# Patient Record
Sex: FEMALE | Race: WHITE | Employment: OTHER | URBAN - METROPOLITAN AREA
[De-identification: names, ages, dates, MRNs, and addresses within clinical notes are randomized per-mention and may not be internally consistent; named-entity substitution may affect disease eponyms.]

---

## 2019-01-08 LAB
AVERAGE GLUCOSE: NORMAL
HBA1C MFR BLD: 7.3 %

## 2019-02-18 ENCOUNTER — TELEPHONE (OUTPATIENT)
Dept: CARDIOLOGY CLINIC | Age: 59
End: 2019-02-18

## 2019-02-25 ENCOUNTER — OFFICE VISIT (OUTPATIENT)
Dept: CARDIOLOGY CLINIC | Age: 59
End: 2019-02-25
Payer: COMMERCIAL

## 2019-02-25 VITALS
HEIGHT: 64 IN | OXYGEN SATURATION: 99 % | SYSTOLIC BLOOD PRESSURE: 126 MMHG | BODY MASS INDEX: 46.95 KG/M2 | HEART RATE: 82 BPM | DIASTOLIC BLOOD PRESSURE: 84 MMHG | WEIGHT: 275 LBS

## 2019-02-25 DIAGNOSIS — E78.5 HYPERLIPIDEMIA LDL GOAL <130: ICD-10-CM

## 2019-02-25 DIAGNOSIS — Z01.810 PREOP CARDIOVASCULAR EXAM: Primary | ICD-10-CM

## 2019-02-25 DIAGNOSIS — I47.1 SVT (SUPRAVENTRICULAR TACHYCARDIA) (HCC): ICD-10-CM

## 2019-02-25 PROCEDURE — 93000 ELECTROCARDIOGRAM COMPLETE: CPT | Performed by: INTERNAL MEDICINE

## 2019-02-25 PROCEDURE — 99244 OFF/OP CNSLTJ NEW/EST MOD 40: CPT | Performed by: INTERNAL MEDICINE

## 2019-02-25 RX ORDER — SIMVASTATIN 20 MG
TABLET ORAL
COMMUNITY
Start: 2018-12-26

## 2019-02-25 RX ORDER — LANOLIN ALCOHOL/MO/W.PET/CERES
2500 CREAM (GRAM) TOPICAL
COMMUNITY

## 2019-02-25 RX ORDER — BUPROPION HYDROCHLORIDE 150 MG/1
150 TABLET ORAL
COMMUNITY
Start: 2019-01-08 | End: 2022-05-07

## 2019-02-25 RX ORDER — ALBUTEROL SULFATE 90 UG/1
2 AEROSOL, METERED RESPIRATORY (INHALATION)
COMMUNITY
Start: 2018-04-08

## 2019-02-25 RX ORDER — ESCITALOPRAM OXALATE 20 MG/1
20 TABLET ORAL
COMMUNITY
Start: 2018-11-01

## 2019-03-13 ENCOUNTER — OFFICE VISIT (OUTPATIENT)
Dept: PULMONOLOGY | Age: 59
End: 2019-03-13
Payer: COMMERCIAL

## 2019-03-13 VITALS
SYSTOLIC BLOOD PRESSURE: 131 MMHG | OXYGEN SATURATION: 96 % | HEART RATE: 80 BPM | DIASTOLIC BLOOD PRESSURE: 83 MMHG | WEIGHT: 275 LBS | BODY MASS INDEX: 46.95 KG/M2 | HEIGHT: 64 IN

## 2019-03-13 DIAGNOSIS — E66.01 MORBID OBESITY WITH BMI OF 45.0-49.9, ADULT (HCC): Chronic | ICD-10-CM

## 2019-03-13 DIAGNOSIS — G47.33 OBSTRUCTIVE SLEEP APNEA SYNDROME: Primary | ICD-10-CM

## 2019-03-13 DIAGNOSIS — I10 ESSENTIAL HYPERTENSION: Chronic | ICD-10-CM

## 2019-03-13 DIAGNOSIS — E11.9 CONTROLLED TYPE 2 DIABETES MELLITUS WITHOUT COMPLICATION, UNSPECIFIED WHETHER LONG TERM INSULIN USE (HCC): Chronic | ICD-10-CM

## 2019-03-13 PROCEDURE — 99204 OFFICE O/P NEW MOD 45 MIN: CPT | Performed by: INTERNAL MEDICINE

## 2019-03-13 ASSESSMENT — ENCOUNTER SYMPTOMS
NAUSEA: 0
ABDOMINAL DISTENTION: 0
PHOTOPHOBIA: 0
EYE PAIN: 0
ALLERGIC/IMMUNOLOGIC NEGATIVE: 1
RHINORRHEA: 0
ABDOMINAL PAIN: 0
VOMITING: 0
SHORTNESS OF BREATH: 0
APNEA: 0
CHOKING: 0
CHEST TIGHTNESS: 0

## 2019-03-13 ASSESSMENT — SLEEP AND FATIGUE QUESTIONNAIRES
HOW LIKELY ARE YOU TO NOD OFF OR FALL ASLEEP WHILE SITTING AND READING: 2
HOW LIKELY ARE YOU TO NOD OFF OR FALL ASLEEP WHEN YOU ARE A PASSENGER IN A CAR FOR AN HOUR WITHOUT A BREAK: 3
HOW LIKELY ARE YOU TO NOD OFF OR FALL ASLEEP IN A CAR, WHILE STOPPED FOR A FEW MINUTES IN TRAFFIC: 0
ESS TOTAL SCORE: 11
HOW LIKELY ARE YOU TO NOD OFF OR FALL ASLEEP WHILE SITTING AND TALKING TO SOMEONE: 1
HOW LIKELY ARE YOU TO NOD OFF OR FALL ASLEEP WHILE SITTING QUIETLY AFTER LUNCH WITHOUT ALCOHOL: 1
HOW LIKELY ARE YOU TO NOD OFF OR FALL ASLEEP WHILE SITTING INACTIVE IN A PUBLIC PLACE: 1
NECK CIRCUMFERENCE (INCHES): 17
HOW LIKELY ARE YOU TO NOD OFF OR FALL ASLEEP WHILE LYING DOWN TO REST IN THE AFTERNOON WHEN CIRCUMSTANCES PERMIT: 1
HOW LIKELY ARE YOU TO NOD OFF OR FALL ASLEEP WHILE WATCHING TV: 2

## 2019-06-17 ENCOUNTER — OFFICE VISIT (OUTPATIENT)
Dept: PULMONOLOGY | Age: 59
End: 2019-06-17
Payer: COMMERCIAL

## 2019-06-17 VITALS
HEIGHT: 64 IN | DIASTOLIC BLOOD PRESSURE: 88 MMHG | BODY MASS INDEX: 46.78 KG/M2 | WEIGHT: 274 LBS | SYSTOLIC BLOOD PRESSURE: 132 MMHG | OXYGEN SATURATION: 96 % | HEART RATE: 96 BPM

## 2019-06-17 DIAGNOSIS — E66.01 MORBID OBESITY WITH BMI OF 40.0-44.9, ADULT (HCC): Chronic | ICD-10-CM

## 2019-06-17 DIAGNOSIS — E11.9 CONTROLLED TYPE 2 DIABETES MELLITUS WITHOUT COMPLICATION, UNSPECIFIED WHETHER LONG TERM INSULIN USE (HCC): Chronic | ICD-10-CM

## 2019-06-17 DIAGNOSIS — G47.33 OBSTRUCTIVE SLEEP APNEA: Chronic | ICD-10-CM

## 2019-06-17 DIAGNOSIS — I10 ESSENTIAL HYPERTENSION: Chronic | ICD-10-CM

## 2019-06-17 PROCEDURE — 99214 OFFICE O/P EST MOD 30 MIN: CPT | Performed by: INTERNAL MEDICINE

## 2019-06-17 ASSESSMENT — SLEEP AND FATIGUE QUESTIONNAIRES
ESS TOTAL SCORE: 11
HOW LIKELY ARE YOU TO NOD OFF OR FALL ASLEEP IN A CAR, WHILE STOPPED FOR A FEW MINUTES IN TRAFFIC: 0
HOW LIKELY ARE YOU TO NOD OFF OR FALL ASLEEP WHILE SITTING AND TALKING TO SOMEONE: 1
HOW LIKELY ARE YOU TO NOD OFF OR FALL ASLEEP WHILE WATCHING TV: 2
HOW LIKELY ARE YOU TO NOD OFF OR FALL ASLEEP WHILE SITTING QUIETLY AFTER LUNCH WITHOUT ALCOHOL: 2
HOW LIKELY ARE YOU TO NOD OFF OR FALL ASLEEP WHILE SITTING INACTIVE IN A PUBLIC PLACE: 1
HOW LIKELY ARE YOU TO NOD OFF OR FALL ASLEEP WHEN YOU ARE A PASSENGER IN A CAR FOR AN HOUR WITHOUT A BREAK: 2
HOW LIKELY ARE YOU TO NOD OFF OR FALL ASLEEP WHILE SITTING AND READING: 3
HOW LIKELY ARE YOU TO NOD OFF OR FALL ASLEEP WHILE LYING DOWN TO REST IN THE AFTERNOON WHEN CIRCUMSTANCES PERMIT: 0

## 2019-06-17 ASSESSMENT — ENCOUNTER SYMPTOMS
RHINORRHEA: 0
APNEA: 0
COUGH: 0
SHORTNESS OF BREATH: 0
CHOKING: 0

## 2019-06-17 NOTE — PROGRESS NOTES
Yes  Flex/EPR (0-3): 3 PAP Mask  Mask Type: Nasal mask  Mask Model: Wisp  Mask Size: XL     GILES: She continues to do well with her machine at the current settings. No issues with EDS, snoring, or apneas. She is waking refreshed, for the most part, in the am.  No HA or congestion. No issues using her machine except rain-out and dryness. Air leaks out of her mouth at times. Hypertension, diabetes mellitus, and obesity: stable on meds and followed by pt's PCP and other physicians.     DME [facility]    [place] - Total score: 11    Social History     Socioeconomic History    Marital status: Single     Spouse name: Not on file    Number of children: Not on file    Years of education: Not on file    Highest education level: Not on file   Occupational History    Not on file   Social Needs    Financial resource strain: Not on file    Food insecurity:     Worry: Not on file     Inability: Not on file    Transportation needs:     Medical: Not on file     Non-medical: Not on file   Tobacco Use    Smoking status: Former Smoker     Packs/day: 1.00     Years: 18.00     Pack years: 18.00     Last attempt to quit:      Years since quittin.4    Smokeless tobacco: Never Used   Substance and Sexual Activity    Alcohol use: No    Drug use: No    Sexual activity: Not on file   Lifestyle    Physical activity:     Days per week: Not on file     Minutes per session: Not on file    Stress: Not on file   Relationships    Social connections:     Talks on phone: Not on file     Gets together: Not on file     Attends Latter-day service: Not on file     Active member of club or organization: Not on file     Attends meetings of clubs or organizations: Not on file     Relationship status: Not on file    Intimate partner violence:     Fear of current or ex partner: Not on file     Emotionally abused: Not on file     Physically abused: Not on file     Forced sexual activity: Not on file   Other Topics Concern    Not on file   Social History Narrative    Not on file       Current Outpatient Medications   Medication Sig Dispense Refill    albuterol sulfate HFA (VENTOLIN HFA) 108 (90 Base) MCG/ACT inhaler Inhale 2 puffs into the lungs      buPROPion (WELLBUTRIN XL) 150 MG extended release tablet Take 150 mg by mouth      escitalopram (LEXAPRO) 20 MG tablet Take 20 mg by mouth      Liraglutide (VICTOZA) 18 MG/3ML SOPN SC injection Inject 1.8 mg into the skin      simvastatin (ZOCOR) 20 MG tablet TAKE ONE TABLET BY MOUTH AT BEDTIME      vitamin B-12 (CYANOCOBALAMIN) 1000 MCG tablet Take 2,500 mcg by mouth      losartan (COZAAR) 25 MG tablet Take 0.5 tablets by mouth daily 30 tablet 3    Cholecalciferol (VITAMIN D3) 2000 UNITS CAPS Take 1 tablet by mouth 2 times daily.  aspirin 81 MG tablet Take 81 mg by mouth daily.  metFORMIN (GLUCOPHAGE) 500 MG tablet Take 1,000 mg by mouth daily (with breakfast). No current facility-administered medications for this visit. Allergies as of 06/17/2019    (No Known Allergies)       Patient Active Problem List   Diagnosis    Chest pain    Morbid obesity with BMI of 40.0-44.9, adult ([facility] [address]    Obstructive sleep apnea    Diabetes mellitus type 2, controlled, without complications (HCC)    Hyperlipidemia    SVT (supraventricular tachycardia) (HCC)    Essential hypertension       Past Medical History:   Diagnosis Date    Diabetes mellitus ([facility] [address]     Hyperlipidemia     Hypertension     Obstructive sleep apnea     Obstructive sleep apnea (adult) (pediatric)     Paroxysmal SVT (supraventricular tachycardia) (HCC)        Past Surgical History:   Procedure Laterality Date    ANKLE FUSION      BREAST SURGERY      FRACTURE SURGERY      HYSTERECTOMY      ROTATOR CUFF REPAIR         Family History   Problem Relation Age of Onset    Sleep Apnea Sister     Sleep Apnea Daughter            ROS:  Review of Systems   Constitutional: Negative.     HENT: Negative for congestion, ear pain and rhinorrhea. Respiratory: Negative for apnea, cough, choking and shortness of breath. Cardiovascular: Negative for chest pain, palpitations and leg swelling. Musculoskeletal: Negative for neck pain. Vitals:  Weight BMI   Wt Readings from Last 3 Encounters:   06/17/19 274 lb (124.3 kg)   03/13/19 275 lb (124.7 kg)   02/25/19 275 lb (124.7 kg)    Body mass index is 47.03 kg/m².      BP HR SaO2   BP Readings from Last 3 Encounters:   06/17/19 132/88   03/13/19 131/83   02/25/19 126/84    Pulse Readings from Last 3 Encounters:   06/17/19 96   03/13/19 80   02/25/19 82    SpO2 Readings from Last 3 Encounters:   06/17/19 96%   03/13/19 96%   02/25/19 99%          Electronically signed by Milka Malone MD on 6/17/2019 at 3:03 PM

## 2019-06-17 NOTE — ASSESSMENT & PLAN NOTE
Chronic- Stable. Reviewed compliance download with pt. Supplies and parts as needed for her machine. These are medically necessary. Continue medications per her PCP and other physicians. Limit caffeine use after 3pm.  Showed how to adjust humidity and tube temp. Needs to try FFM, may need bilevel. 4 month f/u.

## 2021-03-07 ENCOUNTER — HOSPITAL ENCOUNTER (EMERGENCY)
Age: 61
Discharge: HOME | End: 2021-03-07
Attending: EMERGENCY MEDICINE
Payer: COMMERCIAL

## 2021-03-07 VITALS
HEIGHT: 63 IN | SYSTOLIC BLOOD PRESSURE: 181 MMHG | RESPIRATION RATE: 14 BRPM | TEMPERATURE: 98.5 F | DIASTOLIC BLOOD PRESSURE: 102 MMHG | BODY MASS INDEX: 48.54 KG/M2 | OXYGEN SATURATION: 99 % | HEART RATE: 76 BPM

## 2021-03-07 DIAGNOSIS — R82.71 BACTERIA IN URINE: ICD-10-CM

## 2021-03-07 DIAGNOSIS — G89.29 CHRONIC RIGHT-SIDED LOW BACK PAIN WITHOUT SCIATICA: Primary | ICD-10-CM

## 2021-03-07 DIAGNOSIS — M54.50 CHRONIC RIGHT-SIDED LOW BACK PAIN WITHOUT SCIATICA: Primary | ICD-10-CM

## 2021-03-07 DIAGNOSIS — N89.8 VAGINAL ITCHING: ICD-10-CM

## 2021-03-07 LAB
BACTERIA WET PREP: NORMAL
BACTERIA: ABNORMAL /HPF
BILIRUBIN URINE: NEGATIVE
BLOOD, URINE: ABNORMAL
CLARITY: CLEAR
CLUE CELLS: NORMAL
COLOR: YELLOW
EPITHELIAL CELLS WET PREP: NORMAL
EPITHELIAL CELLS, UA: ABNORMAL /HPF (ref 0–5)
GLUCOSE URINE: >=1000 MG/DL
KETONES, URINE: NEGATIVE MG/DL
LEUKOCYTE ESTERASE, URINE: NEGATIVE
MICROSCOPIC EXAMINATION: YES
NITRITE, URINE: NEGATIVE
PH UA: 5 (ref 5–8)
PROTEIN UA: NEGATIVE MG/DL
RBC UA: ABNORMAL /HPF (ref 0–4)
RBC WET PREP: NORMAL
SOURCE WET PREP: NORMAL
SPECIFIC GRAVITY UA: 1.02 (ref 1–1.03)
TRICHOMONAS PREP: NORMAL
URINE REFLEX TO CULTURE: ABNORMAL
URINE TYPE: ABNORMAL
UROBILINOGEN, URINE: 0.2 E.U./DL
WBC UA: ABNORMAL /HPF (ref 0–5)
WBC WET PREP: NORMAL
YEAST WET PREP: NORMAL

## 2021-03-07 PROCEDURE — 87210 SMEAR WET MOUNT SALINE/INK: CPT

## 2021-03-07 PROCEDURE — 6370000000 HC RX 637 (ALT 250 FOR IP): Performed by: EMERGENCY MEDICINE

## 2021-03-07 PROCEDURE — 81001 URINALYSIS AUTO W/SCOPE: CPT

## 2021-03-07 PROCEDURE — 87491 CHLMYD TRACH DNA AMP PROBE: CPT

## 2021-03-07 PROCEDURE — 99284 EMERGENCY DEPT VISIT MOD MDM: CPT

## 2021-03-07 PROCEDURE — 87591 N.GONORRHOEAE DNA AMP PROB: CPT

## 2021-03-07 RX ORDER — FLUCONAZOLE 100 MG/1
150 TABLET ORAL ONCE
Status: COMPLETED | OUTPATIENT
Start: 2021-03-07 | End: 2021-03-07

## 2021-03-07 RX ORDER — CEFUROXIME AXETIL 250 MG/1
250 TABLET ORAL ONCE
Status: COMPLETED | OUTPATIENT
Start: 2021-03-07 | End: 2021-03-07

## 2021-03-07 RX ORDER — CEFUROXIME AXETIL 250 MG/1
250 TABLET ORAL 2 TIMES DAILY
Qty: 14 TABLET | Refills: 0 | Status: SHIPPED | OUTPATIENT
Start: 2021-03-07 | End: 2021-03-14

## 2021-03-07 RX ORDER — CYCLOBENZAPRINE HCL 10 MG
10 TABLET ORAL 2 TIMES DAILY PRN
Qty: 20 TABLET | Refills: 0 | Status: SHIPPED | OUTPATIENT
Start: 2021-03-07 | End: 2021-03-17

## 2021-03-07 RX ADMIN — FLUCONAZOLE 150 MG: 100 TABLET ORAL at 16:22

## 2021-03-07 RX ADMIN — CEFUROXIME AXETIL 250 MG: 250 TABLET ORAL at 16:22

## 2021-03-07 ASSESSMENT — ENCOUNTER SYMPTOMS
COLOR CHANGE: 0
ABDOMINAL PAIN: 0
NAUSEA: 0
TROUBLE SWALLOWING: 0
SHORTNESS OF BREATH: 0
VOICE CHANGE: 0
VOMITING: 0
BACK PAIN: 1
STRIDOR: 0
WHEEZING: 0
FACIAL SWELLING: 0

## 2021-03-07 ASSESSMENT — PAIN DESCRIPTION - FREQUENCY
FREQUENCY: CONTINUOUS
FREQUENCY: CONTINUOUS

## 2021-03-07 ASSESSMENT — PAIN DESCRIPTION - PAIN TYPE: TYPE: ACUTE PAIN

## 2021-03-07 ASSESSMENT — PAIN DESCRIPTION - LOCATION
LOCATION: BACK
LOCATION: BACK

## 2021-03-07 ASSESSMENT — PAIN DESCRIPTION - ORIENTATION: ORIENTATION: RIGHT

## 2021-03-07 ASSESSMENT — PAIN - FUNCTIONAL ASSESSMENT: PAIN_FUNCTIONAL_ASSESSMENT: ACTIVITIES ARE NOT PREVENTED

## 2021-03-07 ASSESSMENT — PAIN DESCRIPTION - DESCRIPTORS: DESCRIPTORS: SHARP;ACHING

## 2021-03-07 NOTE — ED PROVIDER NOTES
[facility]  eMERGENCY dEPARTMENT eNCOUnter      Pt Name: Shyam Baker  MRN: [id number]  [place] 1960  Date of evaluation: 3/7/2021  Provider: Kashif Marquez MD    [address] [place] [address]       Chief Complaint   Patient presents with    Back Pain     right side, started this morning, denies injury, radiating down the right leg    Vaginal Itching     going on a couple of weeks, sometimes frequent urination, patient is diabetic         HISTORY OF PRESENT ILLNESS   (Location/Symptom, Timing/Onset, Context/Setting, Quality, Duration, Modifying Factors, Severity)  Note limiting factors. Shyam Baker is a 63 y.o. female who reports she has chronic right-sided lower back pain which she does see a pain management specialist for and is prescribed diclofenac and p.o. morphine who presents with worsening of her right lower back pain starting this morning. Patient reports he was walking down steps when the pain started. Denies any fall or direct trauma. Denies any fever. Denies any urinary or bowel incontinence. She denies any leg numbness or weakness. She denies any IV drug use or other red flag symptoms. She reports this is identical to multiple previous episodes of back pain. She also reports 2 weeks of vaginal itching and frequent urination. Patient does have diabetes. She reports her symptoms are moderate, constant, and worsening. HPI    Nursing Notes were reviewed. REVIEW OFSYSTEMS    (2-9 systems for level 4, 10 or more for level 5)     Review of Systems   Constitutional: Negative for appetite change, fever and unexpected weight change. HENT: Negative for facial swelling, trouble swallowing and voice change. Eyes: Negative for visual disturbance. Respiratory: Negative for shortness of breath, wheezing and stridor. Cardiovascular: Negative for chest pain and palpitations. Gastrointestinal: Negative for abdominal pain, nausea and vomiting.    Genitourinary: Negative for dysuria and vaginal bleeding. Vaginal itching   Musculoskeletal: Positive for back pain. Negative for neck pain and neck stiffness. Skin: Negative for color change and wound. Neurological: Negative for seizures and syncope. Psychiatric/Behavioral: Negative for self-injury and suicidal ideas. Except as noted above the remainder of the review of systems was reviewed and negative. PAST MEDICAL HISTORY     Past Medical History:   Diagnosis Date    Diabetes mellitus ([address]     Hyperlipidemia     Hypertension     Obstructive sleep apnea     Obstructive sleep apnea (adult) (pediatric)     Paroxysmal SV[facility]ra[facility]ricular tachycardia) [facility]          SURGICAL HISTORY       Past Surgical History:   Procedure Laterality Date    ANKLE FUSION      BREAST SURGERY      FRACTURE SURGERY      HYSTERECTOMY      ROTATOR CUFF REPAIR           CURRENT MEDICATIONS       Previous Medications    ALBUTEROL SULFATE HFA (VENTOLIN HFA) 108 (90 BASE) MCG/ACT INHALER    Inhale 2 puffs into the lungs    ASPIRIN 81 MG TABLET    Take 81 mg by mouth daily. BUPROPION (WELLBUTRIN XL) 150 MG EXTENDED RELEASE TABLET    Take 150 mg by mouth    CHOLECALCIFEROL (VITAMIN D3) 2000 UNITS CAPS    Take 1 tablet by mouth 2 times daily. ESCITALOPRAM (LEXAPRO) 20 MG TABLET    Take 20 mg by mouth    LIRAGLUTIDE (VICTOZA) 18 MG/3ML SOPN SC INJECTION    Inject 1.8 mg into the skin    LOSARTAN (COZAAR) 25 MG TABLET    Take 0.5 tablets by mouth daily    METFORMIN (GLUCOPHAGE) 500 MG TABLET    Take 1,000 mg by mouth daily (with breakfast). SIMVASTATIN (ZOCOR) 20 MG TABLET    TAKE ONE TABLET BY MOUTH AT BEDTIME    VITAMIN B-12 (CYANOCOBALAMIN) 1000 MCG TABLET    Take 2,500 mcg by mouth       ALLERGIES     Patient has no known allergies.     FAMILY HISTORY       Family History   Problem Relation Age of Onset    Sleep Apnea Sister     Sleep Apnea Daughter           SOCIAL HISTORY       Social History     Socioeconomic History    Marital status: Single     Spouse name: None    Number of children: None    Years of education: None    Highest education level: None   Occupational History    None   Social Needs    Financial resource strain: None    Food insecurity     Worry: None     Inability: None    Transportation needs     Medical: None     Non-medical: None   Tobacco Use    Smoking status: Former Smoker     Packs/day: 1.00     Years: 18.00     Pack years: 18.00     Quit date:      Years since quittin.1    Smokeless tobacco: Never Used   Substance and Sexual Activity    Alcohol use: No    Drug use: No    Sexual activity: None   Lifestyle    Physical activity     Days per week: None     Minutes per session: None    Stress: None   Relationships    Social connections     Talks on phone: None     Gets together: None     Attends Sabianist service: None     Active member of club or organization: None     Attends meetings of clubs or organizations: None     Relationship status: None    Intimate partner violence     Fear of current or ex partner: None     Emotionally abused: None     Physically abused: None     Forced sexual activity: None   Other Topics Concern    None   Social History Narrative    None         PHYSICAL EXAM    (up to 7 for level 4, 8 or more for level 5)     ED Triage Vitals [21 1507]   BP Temp Temp Source Pulse Resp SpO2 Height Weight   (!) 188/100 98.5 °F (36.9 °C) Oral 82 16 99 % 5' 3\" (1.6 m) --       Physical Exam  Vitals signs and nursing note reviewed. Constitutional:       Appearance: She is well-developed. She is not diaphoretic. HENT:      Head: Normocephalic and atraumatic. Right Ear: External ear normal.      Left Ear: External ear normal.   Eyes:      Conjunctiva/sclera: Conjunctivae normal.   Neck:      Musculoskeletal: Neck supple. Vascular: No JVD. Trachea: No tracheal deviation. Cardiovascular:      Rate and Rhythm: Normal rate.    Pulmonary: Effort: Pulmonary effort is normal. No respiratory distress. Breath sounds: Normal breath sounds. No wheezing. Abdominal:      General: There is no distension. Palpations: Abdomen is soft. Tenderness: There is no abdominal tenderness. There is no guarding or rebound. Genitourinary:     Comments: External vaginal exam shows mild erythema of the vulvovaginal area with no white plaques or discharge. Musculoskeletal: Normal range of motion. General: Tenderness present. No swelling, deformity or signs of injury. Comments: Right-sided paraspinal lumbar tenderness palpation. No midline tenderness to palpation. No rash or visual abnormality   Skin:     General: Skin is warm and dry. Neurological:      General: No focal deficit present. Mental Status: She is alert and oriented to person, place, and time. Cranial Nerves: No cranial nerve deficit. Comments: No saddle anesthesia. Sensation intact in all nerves of bilateral lower extremities. Positive right-sided straight leg raise.          DIAGNOSTIC RESULTS         LABS:  Labs Reviewed   URINE RT REFLEX TO CULTURE - Abnormal; Notable for the following components:       Result Value    Glucose, Ur >=1000 (*)     Blood, Urine SMALL (*)     All other components within normal limits    Narrative:     Performed at:  [facility]  [address]   Phone [phone]   MICROSCOPIC URINALYSIS - Abnormal; Notable for the following components:    WBC, UA 6-9 (*)     RBC, UA 5-10 (*)     Bacteria, UA Rare (*)     All other components within normal limits    Narrative:     Performed at:  [facility]  [address]   Phone [phone]   WET PREP, GENITAL    Narrative:     Performed at:  [facility]  [address]   Phone [phone] C. TRACHOMATIS N.GONORRHOEAE DNA       All otherlabs were within normal range or not returned as of this dictation. EMERGENCY DEPARTMENT COURSE and DIFFERENTIAL DIAGNOSIS/MDM:   Vitals:    Vitals:    03/07/21 1507 03/07/21 1611   BP: (!) 188/100 (!) 181/102   Pulse: 82 76   Resp: 16 14   Temp: 98.5 °F (36.9 °C)    TempSrc: Oral    SpO2: 99% 99%   Height: 5' 3\" (1.6 m)          MDM  Patient is afebrile, nontoxic-appearing, in no acute distress. Urinalysis does show small amount of bacteria in the urine. Patient does have a large amount of glucose in the urine I have recommend that she stay hydrated, watch her sugars carefully, follow-up with her primary care doctor. I do not suspect CNS compression or emergent red flag signs of back pain and feel she is appropriate for muscle x-rays and stretches in addition to the narcotics and NSAIDs that she is already taking as well as follow-up with her primary care doctor. Very strict ER return precautions were given for any new or worsening signs of emergent causes of back pain which I reviewed with her. Given the bacteria in the urine I will prescribe her a one-time dose of Depakene as well as p.o. antibiotics and have recommended primary care follow-up. The patient expresses understanding and agreement with this plan and is discharged home. Procedures    FINAL IMPRESSION      1. Chronic right-sided low back pain without sciatica    2. Vaginal itching    3. Bacteria in urine          DISPOSITION/PLAN   DISPOSITION Decision To Discharge 03/07/2021 04:18:01 PM      PATIENT REFERRED TO:  Yani Bryan MD  [address] [facility]  [phone]    In 1 week      [facility] [place] [address]  [phone]    If symptoms worsen      DISCHARGE MEDICATIONS:  New Prescriptions    CEFUROXIME (CEFTIN) 250 MG TABLET    Take 1 tablet by mouth 2 times daily for 7 days    CYCLOBENZAPRINE (FLEXERIL) 10 MG TABLET Take 1 tablet by mouth 2 times daily as needed for Muscle spasms          (Please note that portions of this note were completed with a voice recognition program.  Efforts were made to edit the dictations but occasionally words aremis-transcribed. )    Gallo Jordan MD (electronically signed)  Attending Emergency Physician           Gallo Jordan MD  03/07/21 2030

## 2021-03-07 NOTE — ED NOTES
Patient states her back hurts too bad to self swab. Assisted to perform the collection with her permission. Tolerated it well.        Maryuri Castaneda RN  03/07/21 2903

## 2021-03-07 NOTE — ED NOTES
Patient verbalized understanding of discharge instructions and follow-up care. Patient was e-scribed 2 prescriptions. Breathing regular, no distress. Patient alert and oriented X3. Patient ambulated out of emergency department with steady gait to private vehicle.       Vera Savage RN  03/07/21 6925

## 2021-03-09 LAB
C TRACH DNA GENITAL QL NAA+PROBE: NEGATIVE
N. GONORRHOEAE DNA: NEGATIVE

## 2022-01-20 ENCOUNTER — TELEPHONE (OUTPATIENT)
Dept: PULMONOLOGY | Age: 62
End: 2022-01-20

## 2022-01-25 ENCOUNTER — VIRTUAL VISIT (OUTPATIENT)
Dept: PULMONOLOGY | Age: 62
End: 2022-01-25
Payer: COMMERCIAL

## 2022-01-25 DIAGNOSIS — I47.1 SVT (SUPRAVENTRICULAR TACHYCARDIA) (HCC): Chronic | ICD-10-CM

## 2022-01-25 DIAGNOSIS — E11.9 CONTROLLED TYPE 2 DIABETES MELLITUS WITHOUT COMPLICATION, UNSPECIFIED WHETHER LONG TERM INSULIN USE (HCC): Chronic | ICD-10-CM

## 2022-01-25 DIAGNOSIS — I10 ESSENTIAL HYPERTENSION: Chronic | ICD-10-CM

## 2022-01-25 DIAGNOSIS — G47.33 OBSTRUCTIVE SLEEP APNEA: Primary | Chronic | ICD-10-CM

## 2022-01-25 PROCEDURE — 2022F DILAT RTA XM EVC RTNOPTHY: CPT | Performed by: NURSE PRACTITIONER

## 2022-01-25 PROCEDURE — 99214 OFFICE O/P EST MOD 30 MIN: CPT | Performed by: NURSE PRACTITIONER

## 2022-01-25 PROCEDURE — 3017F COLORECTAL CA SCREEN DOC REV: CPT | Performed by: NURSE PRACTITIONER

## 2022-01-25 PROCEDURE — 3046F HEMOGLOBIN A1C LEVEL >9.0%: CPT | Performed by: NURSE PRACTITIONER

## 2022-01-25 PROCEDURE — G8427 DOCREV CUR MEDS BY ELIG CLIN: HCPCS | Performed by: NURSE PRACTITIONER

## 2022-01-25 RX ORDER — DULAGLUTIDE 1.5 MG/.5ML
1.5 INJECTION, SOLUTION SUBCUTANEOUS WEEKLY
COMMUNITY

## 2022-01-25 ASSESSMENT — SLEEP AND FATIGUE QUESTIONNAIRES
HOW LIKELY ARE YOU TO NOD OFF OR FALL ASLEEP WHILE WATCHING TV: 0
HOW LIKELY ARE YOU TO NOD OFF OR FALL ASLEEP IN A CAR, WHILE STOPPED FOR A FEW MINUTES IN TRAFFIC: 0
HOW LIKELY ARE YOU TO NOD OFF OR FALL ASLEEP WHEN YOU ARE A PASSENGER IN A CAR FOR AN HOUR WITHOUT A BREAK: 0
HOW LIKELY ARE YOU TO NOD OFF OR FALL ASLEEP WHILE SITTING QUIETLY AFTER LUNCH WITHOUT ALCOHOL: 0
ESS TOTAL SCORE: 1
HOW LIKELY ARE YOU TO NOD OFF OR FALL ASLEEP WHILE SITTING AND READING: 0
HOW LIKELY ARE YOU TO NOD OFF OR FALL ASLEEP WHILE SITTING AND TALKING TO SOMEONE: 0
HOW LIKELY ARE YOU TO NOD OFF OR FALL ASLEEP WHILE SITTING INACTIVE IN A PUBLIC PLACE: 0
HOW LIKELY ARE YOU TO NOD OFF OR FALL ASLEEP WHILE LYING DOWN TO REST IN THE AFTERNOON WHEN CIRCUMSTANCES PERMIT: 1

## 2022-01-25 NOTE — LETTER
[facility]  [address]  [place] [address]  Phone: [phone]  Fax: [phone]    January 25, 2022       Patient: Ree Medina   MR Number: [id number]   YOB: 1960   Date of Visit: 1/25/2022       Deborah Adorno was seen for a follow up visit today. Here is my assessment and plan as well as an attached copy of her visit today:    SVT (supraventricular tachycardia) ([facility] [address]  Chronic- Stable. Discussed the importance of treating obstructive sleep apnea as part of the management of this disorder. Cont any meds per PCP and other physicians. Essential hypertension  Chronic- Stable. Discussed the importance of treating obstructive sleep apnea as part of the management of this disorder. Cont any meds per PCP and other physicians. Diabetes mellitus type 2, controlled, without complications (HCC)  Chronic- Stable. Discussed the importance of treating obstructive sleep apnea as part of the management of this disorder. Cont any meds per PCP and other physicians. Obstructive sleep apnea  Chronic-Stable: Reviewed and analyzed results of physiologic download from patient's machine and reviewed with patient. Supplies and parts as needed for her machine. These are medically necessary. Limit caffeine use after 3pm. Based on the analyzed data will continue with current settings. Stable on her machine at current settings, getting benefit from the use, and having minimal side effects. Will continue to monitor pressure as she continues to lose weight. Recommend she schedule a mask fitting with [employer] due to leak and weight loss. Discussed chin strap/ FFM, and adjusting moisture settings for oral dryness. Will see back in 6 months or sooner if issues arise. If you have questions or concerns, please do not hesitate to call me. I look forward to following Reina Black along with you.     Sincerely,    DEEPAK Kaur    CC providers:  Tessie Tapia MD  [address] [facility] [address]  Via Fax: [phone]

## 2022-01-25 NOTE — PROGRESS NOTES
Janan Dandy MD, [facility], [facility]  Reyes Lor CNP [address]  [address] [facility], [address] [phone]   [facility] [address] Canelo Hernandez. [address] [place] [address]. Ricki Hame[address] [phone]     Video Visit- Follow up    Canelo Ginette, was evaluated through a synchronous (real-time) audio-video  encounter. The patient (or guardian if applicable) is aware that this is a billable  service, which includes applicable co-pays. This Virtual Visit was conducted with  patient's (and/or legal guardian's) consent. The visit was conducted pursuant to  the emergency declaration under the [address] [facility], [address] [place] [address] [facility] authority and the [employer] and  [employer] General Act. Patient identification was verified,  and a caregiver was present when appropriate. The patient was located in a  state where the provider was licensed to provide care. Assessment/Plan:      1. Obstructive sleep apnea  Assessment & Plan:  Chronic-Stable: Reviewed and analyzed results of physiologic download from patient's machine and reviewed with patient. Supplies and parts as needed for her machine. These are medically necessary. Limit caffeine use after 3pm. Based on the analyzed data will continue with current settings. Stable on her machine at current settings, getting benefit from the use, and having minimal side effects. Will continue to monitor pressure as she continues to lose weight. Recommend she schedule a mask fitting with [employer] due to leak and weight loss. Discussed chin strap/ FFM, and adjusting moisture settings for oral dryness. Will see back in 6 months or sooner if issues arise. 2. SVT (supraventricular tachycardia) ([facility])  Assessment & Plan:  Chronic- Stable. Discussed the importance of treating obstructive sleep apnea as part of the management of this disorder. Cont any meds per PCP and other physicians.     3. Essential hypertension  Assessment & Plan:  Chronic- Stable. Discussed the importance of treating obstructive sleep apnea as part of the management of this disorder. Cont any meds per PCP and other physicians. 4. Controlled type 2 diabetes mellitus without complication, unspecified whether long term insulin use ([facility] [address]  Assessment & Plan:  Chronic- Stable. Discussed the importance of treating obstructive sleep apnea as part of the management of this disorder. Cont any meds per PCP and other physicians. Reviewed, analyzed, and documented physiologic data from patient's PAP machine. This information was analyzed to assess complexity and medical decision making in regards to further testing and management. The primary encounter diagnosis was Obstructive sleep apnea. Diagnoses of SVT (supraventricular tachycardia) ([facility] [address] Essential hypertension, and Controlled type 2 diabetes mellitus without complication, unspecified whether long term insulin use ([facility] were also pertinent to this visit. The chronic medical conditions listed are directly related to the primary diagnosis listed above. The management of the primary diagnosis affects the secondary diagnosis and vice versa. Subjective:     Patient ID: Dhara Cortez is a 57 y.o. female. Chief Complaint   Patient presents with    Sleep Apnea     Subjective   HPI:    Machine Modem/Download Info:  Compliance (hours/night): 6.94 hrs/night  % of nights >= 4 hrs: 100 %  Download AHI (/hour): 2.1 /HR  Average CPAP Pressure : 17.6 cmH2O      APAP - Settings  Pressure Min: 17 cmH2O  Pressure Max: 20 cmH2O                 Comfort Settings  Heated Tubing (Yes/No): Yes  Flex/EPR (0-3): 3 PAP Mask  Mask Type: Nasal mask  Clinically Relevant Leak: Yes     Dhara Cortez reports she is doing well with her machine. Had weight loss surgery in December 2021. She has been starting to lose weight. Pressure feels good and she is waking rested for the most part. She has mouth dryness in the mornings.  Is not sure if her mouth opens during the night. She has noticed some mask leak but has not replaced supplies in some time. she denies headaches, congestion, nosebleeds, dryness, aerophagia, or drowsiness while driving.      [facility]    [place] - Total score: 1    Current Outpatient Medications   Medication Instructions    albuterol sulfate HFA (VENTOLIN HFA) 108 (90 Base) MCG/ACT inhaler 2 puffs, Inhalation    aspirin 81 mg, Oral, DAILY    buPROPion (WELLBUTRIN XL) 150 mg, Oral    Cholecalciferol (VITAMIN D3) 2000 UNITS CAPS 1 tablet, Oral, 2 TIMES DAILY    escitalopram (LEXAPRO) 20 mg, Oral    Liraglutide (VICTOZA) 1.8 mg    losartan (COZAAR) 12.5 mg, Oral, DAILY    metFORMIN (GLUCOPHAGE) 1,000 mg, DAILY WITH BREAKFAST    Morphine Sulfate (MORPHINE 20 MG/ML ORAL SOLN 0.25 ML, SMALL, CMPD) 20 mg, 2 TIMES DAILY    Multiple Vitamin (MULTI-VITAMIN DAILY PO) Oral    simvastatin (ZOCOR) 20 MG tablet TAKE ONE TABLET BY MOUTH AT BEDTIME    Trulicity 1.5 mg, SubCUTAneous, WEEKLY    vitamin B-12 (CYANOCOBALAMIN) 2,500 mcg, Oral        Electronically signed by DEEPAK Soares on 1/25/2022 at 3:43 PM

## 2022-01-25 NOTE — PROGRESS NOTES
Diagnosis: [x] GILES (G47.33) [] CSA (G47.31) [] Apnea (G47.30)   Length of Need: [x] 15 Months [] 99 Months [] Other:   Machine (SARA!): [] Respironics Dream Station      Auto [] ResMed AirSense     Auto [] Other:     []  CPAP () [] Bilevel ()   Mode: [] Auto [] Spontaneous    Mode: [] Auto [] Spontaneous            Comfort Settings:      Humidifier: [] Heated ()        [x] Water chamber replacement ()/ 1 per 6 months        Mask:   [x] Nasal () /1 per 3 months [] Full Face () /1 per 3 months   [x] Patient choice -Size and fit mask [] Patient Choice - Size and fit mask   [] Dispense: [] Dispense:   [x] Headgear () / 1 per 3 months [] Headgear () / 1 per 3 months   [x] Replacement Nasal Cushion ()/2 per month [] Interface Replacement ()/1 per month   [] Replacement Nasal Pillows ()/2 per month         Tubing: [x] Heated ()/1 per 3 months    [] Standard ()/1 per 3 months [] Other:           Filters: [x] Non-disposable ()/1 per 6 months     [x] Ultra-Fine, Disposable ()/2 per month        Miscellaneous: [] Chin Strap ()/ 1 per 6 months [] O2 bleed-in:        LPM   [] Oxymetry on CPAP/Bilevel []  Other:         Start Order Date: 01/25/22    MEDICAL JUSTIFICATION:  I, the undersigned, certify that the above prescribed supplies are medically necessary for this patients wellbeing. In my opinion, the supplies are both reasonable and necessary in reference to accepted standards of medicalpractice in treatment of this patients condition. Genaro Mak NP    NPI: [id number]       Order Signed Date: 01/25/22  350 [facility]  Pulmonary, Sleep, and Critical Care    Pulmonary, Sleep, and Critical Care  [address] [place] [address].  [facility] [place], [facility] , [address] [facility]                                    ShawneeJose Oliveros Natalia  Phone: [phone]    Fax: [address] [facility]  1960  [address] [place] [address]  [phone] (home)   [phone] (mobile)      Insurance Info (confirm with patient if correct):  Payor/Plan Subscr  Sex Relation Sub.  Ins. ID Effective Group Num

## 2022-01-25 NOTE — ASSESSMENT & PLAN NOTE
Chronic-Stable: Reviewed and analyzed results of physiologic download from patient's machine and reviewed with patient. Supplies and parts as needed for her machine. These are medically necessary. Limit caffeine use after 3pm. Based on the analyzed data will continue with current settings. Stable on her machine at current settings, getting benefit from the use, and having minimal side effects. Will continue to monitor pressure as she continues to lose weight. Recommend she schedule a mask fitting with [employer] due to leak and weight loss. Discussed chin strap/ FFM, and adjusting moisture settings for oral dryness. Will see back in 6 months or sooner if issues arise.

## 2022-05-07 ENCOUNTER — APPOINTMENT (OUTPATIENT)
Dept: CT IMAGING | Age: 62
End: 2022-05-07
Payer: COMMERCIAL

## 2022-05-07 ENCOUNTER — HOSPITAL ENCOUNTER (EMERGENCY)
Age: 62
Discharge: HOME | End: 2022-05-07
Attending: EMERGENCY MEDICINE
Payer: COMMERCIAL

## 2022-05-07 VITALS
HEART RATE: 73 BPM | WEIGHT: 212.3 LBS | RESPIRATION RATE: 14 BRPM | SYSTOLIC BLOOD PRESSURE: 136 MMHG | BODY MASS INDEX: 37.62 KG/M2 | HEIGHT: 63 IN | TEMPERATURE: 99 F | OXYGEN SATURATION: 97 % | DIASTOLIC BLOOD PRESSURE: 85 MMHG

## 2022-05-07 DIAGNOSIS — R31.0 GROSS HEMATURIA: Primary | ICD-10-CM

## 2022-05-07 LAB
A/G RATIO: 1.5 (ref 1.1–2.2)
ALBUMIN SERPL-MCNC: 4.3 G/DL (ref 3.4–5)
ALP BLD-CCNC: 75 U/L (ref 40–129)
ALT SERPL-CCNC: 15 U/L (ref 10–40)
AMORPHOUS: ABNORMAL /HPF
ANION GAP SERPL CALCULATED.3IONS-SCNC: 12 MMOL/L (ref 3–16)
AST SERPL-CCNC: 23 U/L (ref 15–37)
BASOPHILS ABSOLUTE: 0 K/UL (ref 0–0.2)
BASOPHILS RELATIVE PERCENT: 0 %
BILIRUB SERPL-MCNC: 1.2 MG/DL (ref 0–1)
BILIRUBIN URINE: ABNORMAL
BLOOD, URINE: ABNORMAL
BUN BLDV-MCNC: 23 MG/DL (ref 7–20)
CALCIUM SERPL-MCNC: 10 MG/DL (ref 8.3–10.6)
CHLORIDE BLD-SCNC: 103 MMOL/L (ref 99–110)
CLARITY: ABNORMAL
CO2: 27 MMOL/L (ref 21–32)
COLOR: ABNORMAL
CREAT SERPL-MCNC: 0.9 MG/DL (ref 0.6–1.2)
EOSINOPHILS ABSOLUTE: 0.1 K/UL (ref 0–0.6)
EOSINOPHILS RELATIVE PERCENT: 2 %
EPITHELIAL CELLS, UA: ABNORMAL /HPF (ref 0–5)
GFR AFRICAN AMERICAN: >60
GFR NON-AFRICAN AMERICAN: >60
GLUCOSE BLD-MCNC: 130 MG/DL (ref 70–99)
GLUCOSE URINE: NEGATIVE MG/DL
HCT VFR BLD CALC: 39.8 % (ref 36–48)
HEMOGLOBIN: 13.4 G/DL (ref 12–16)
INR BLD: 1 (ref 0.88–1.12)
KETONES, URINE: ABNORMAL MG/DL
LEUKOCYTE ESTERASE, URINE: NEGATIVE
LYMPHOCYTES ABSOLUTE: 1.8 K/UL (ref 1–5.1)
LYMPHOCYTES RELATIVE PERCENT: 27 %
MACROCYTES: ABNORMAL
MCH RBC QN AUTO: 30.7 PG (ref 26–34)
MCHC RBC AUTO-ENTMCNC: 33.8 G/DL (ref 31–36)
MCV RBC AUTO: 90.9 FL (ref 80–100)
MICROSCOPIC EXAMINATION: YES
MONOCYTES ABSOLUTE: 0.3 K/UL (ref 0–1.3)
MONOCYTES RELATIVE PERCENT: 4 %
MUCUS: ABNORMAL /LPF
NEUTROPHILS ABSOLUTE: 4.4 K/UL (ref 1.7–7.7)
NEUTROPHILS RELATIVE PERCENT: 67 %
NITRITE, URINE: NEGATIVE
PDW BLD-RTO: 12.7 % (ref 12.4–15.4)
PH UA: 5.5 (ref 5–8)
PLATELET # BLD: 276 K/UL (ref 135–450)
PLATELET SLIDE REVIEW: ADEQUATE
PMV BLD AUTO: 7.5 FL (ref 5–10.5)
POTASSIUM SERPL-SCNC: 3.6 MMOL/L (ref 3.5–5.1)
PROTEIN UA: >=300 MG/DL
PROTHROMBIN TIME: 22 SEC (ref 9.9–12.7)
RBC # BLD: 4.38 M/UL (ref 4–5.2)
RBC UA: >100 /HPF (ref 0–4)
SLIDE REVIEW: ABNORMAL
SODIUM BLD-SCNC: 142 MMOL/L (ref 136–145)
SPECIFIC GRAVITY UA: >=1.03 (ref 1–1.03)
TOTAL PROTEIN: 7.2 G/DL (ref 6.4–8.2)
URINE REFLEX TO CULTURE: ABNORMAL
URINE TYPE: ABNORMAL
UROBILINOGEN, URINE: 1 E.U./DL
WBC # BLD: 6.6 K/UL (ref 4–11)
WBC UA: ABNORMAL /HPF (ref 0–5)

## 2022-05-07 PROCEDURE — 99285 EMERGENCY DEPT VISIT HI MDM: CPT

## 2022-05-07 PROCEDURE — 74177 CT ABD & PELVIS W/CONTRAST: CPT

## 2022-05-07 PROCEDURE — 85025 COMPLETE CBC W/AUTO DIFF WBC: CPT

## 2022-05-07 PROCEDURE — 36415 COLL VENOUS BLD VENIPUNCTURE: CPT

## 2022-05-07 PROCEDURE — 6360000004 HC RX CONTRAST MEDICATION: Performed by: EMERGENCY MEDICINE

## 2022-05-07 PROCEDURE — 87086 URINE CULTURE/COLONY COUNT: CPT

## 2022-05-07 PROCEDURE — 6370000000 HC RX 637 (ALT 250 FOR IP): Performed by: EMERGENCY MEDICINE

## 2022-05-07 PROCEDURE — 80053 COMPREHEN METABOLIC PANEL: CPT

## 2022-05-07 PROCEDURE — 81001 URINALYSIS AUTO W/SCOPE: CPT

## 2022-05-07 RX ORDER — PANTOPRAZOLE SODIUM 40 MG/1
40 TABLET, DELAYED RELEASE ORAL 2 TIMES DAILY
COMMUNITY
Start: 2022-02-14

## 2022-05-07 RX ORDER — BUSPIRONE HYDROCHLORIDE 10 MG/1
10 TABLET ORAL 2 TIMES DAILY
COMMUNITY
Start: 2021-11-19

## 2022-05-07 RX ORDER — CEFUROXIME AXETIL 250 MG/1
500 TABLET ORAL ONCE
Status: COMPLETED | OUTPATIENT
Start: 2022-05-07 | End: 2022-05-07

## 2022-05-07 RX ORDER — CEFUROXIME AXETIL 250 MG/1
250 TABLET ORAL 2 TIMES DAILY
Qty: 14 TABLET | Refills: 0 | Status: SHIPPED | OUTPATIENT
Start: 2022-05-07 | End: 2022-05-14

## 2022-05-07 RX ADMIN — CEFUROXIME AXETIL 500 MG: 250 TABLET ORAL at 20:21

## 2022-05-07 RX ADMIN — IOPAMIDOL 100 ML: 755 INJECTION, SOLUTION INTRAVENOUS at 19:25

## 2022-05-07 ASSESSMENT — PAIN DESCRIPTION - DESCRIPTORS
DESCRIPTORS: ACHING
DESCRIPTORS: ACHING

## 2022-05-07 ASSESSMENT — PAIN DESCRIPTION - ORIENTATION
ORIENTATION: RIGHT;LEFT
ORIENTATION: LOWER

## 2022-05-07 ASSESSMENT — PAIN SCALES - GENERAL
PAINLEVEL_OUTOF10: 4
PAINLEVEL_OUTOF10: 6

## 2022-05-07 ASSESSMENT — PAIN - FUNCTIONAL ASSESSMENT: PAIN_FUNCTIONAL_ASSESSMENT: 0-10

## 2022-05-07 ASSESSMENT — PAIN DESCRIPTION - LOCATION
LOCATION: BACK
LOCATION: BACK

## 2022-05-07 NOTE — ED PROVIDER NOTES
[facility]  eMERGENCY dEPARTMENT eNCOUnter      Pt Name: Brooke Le  MRN: [id number]  [place] 1960  Date of evaluation: 5/7/2022  Provider: Myriam Rueda MD    [address] [place] [address]       Chief Complaint   Patient presents with    Hematuria     pt. c/o blood in urine on & off for 2 weeks         CRITICAL CARE TIME   Total Critical Care time was 0 minutes, excluding separately reportable procedures. There was a high probability of clinically significant/life threatening deterioration in the patient's condition which required my urgent intervention. HISTORY OF PRESENT ILLNESS  (Location/Symptom, Timing/Onset, Context/Setting, Quality, Duration, Modifying Factors, Severity.)   Brooke Le is a 60 y.o. female who presents to the emergency department for blood in her urine off and on for the last 2 weeks. No abdominal pain. No nausea or vomiting. No dysuria or frequency. She has chronic bilateral low back pain. She states it has been worse than usual for the last several weeks. She states he actually did a CT of her lumbar spine at [facility] in early March because of her worsening back pain. No history of kidney stones. No history of any recent urinary tract infection. Nursing Notes were reviewed and I agree. REVIEW OF SYSTEMS    (2-9 systems for level 4, 10 or more for level 5)     General: No fever or chills. ENT: No nasal congestion sore throat or earache. Cardiovascular: No chest pain. Pulmonary: No shortness of breath or cough. GI: No abdominal pain, nausea, vomiting. : Intermittent blood in her urine for 2 weeks. No dysuria or frequency. Neuro: No dizziness or passing out. Except as noted above the remainder of the review of systems was reviewed and negative.        PAST MEDICAL HISTORY     Past Medical History:   Diagnosis Date    Anxiety     Diabetes mellitus ([facility] [address] [address]     Hyperlipidemia     Hypertension     Obstructive sleep apnea     Obstructive sleep apnea (adult) (pediatric)     Paroxysmal SVT (supraventricular tachycardia) (HCC)          SURGICAL HISTORY       Past Surgical History:   Procedure Laterality Date    ANKLE FUSION      BREAST SURGERY Bilateral     reduction    FRACTURE SURGERY Left     ankle    GASTRIC BYPASS SURGERY      HYSTERECTOMY      ROTATOR CUFF REPAIR           CURRENT MEDICATIONS       Previous Medications    ALBUTEROL SULFATE HFA (VENTOLIN HFA) 108 (90 BASE) MCG/ACT INHALER    Inhale 2 puffs into the lungs    ASPIRIN 81 MG TABLET    Take 81 mg by mouth daily. BUSPIRONE (BUSPAR) 10 MG TABLET    Take 10 mg by mouth 2 times daily    CHOLECALCIFEROL (VITAMIN D3) 2000 UNITS CAPS    Take 1 tablet by mouth 2 times daily. DULAGLUTIDE (TRULICITY) 1.5 ZE/0.8YN SOPN    Inject 1.5 mg into the skin once a week    ESCITALOPRAM (LEXAPRO) 20 MG TABLET    Take 20 mg by mouth    LOSARTAN (COZAAR) 25 MG TABLET    Take 0.5 tablets by mouth daily    MORPHINE SULFATE (MORPHINE 20 MG/ML ORAL SOLN 0.25 ML, SMALL, CMPD)    20 mg 2 times daily. MULTIPLE VITAMIN (MULTI-VITAMIN DAILY PO)    Take by mouth    PANTOPRAZOLE (PROTONIX) 40 MG TABLET    Take 40 mg by mouth 2 times daily    SIMVASTATIN (ZOCOR) 20 MG TABLET    TAKE ONE TABLET BY MOUTH AT BEDTIME    VITAMIN B-12 (CYANOCOBALAMIN) 1000 MCG TABLET    Take 2,500 mcg by mouth       ALLERGIES     Patient has no known allergies.     FAMILY HISTORY       Family History   Problem Relation Age of Onset    Sleep Apnea Sister     Sleep Apnea Daughter           SOCIAL HISTORY       Social History     Socioeconomic History    Marital status: Single     Spouse name: None    Number of children: None    Years of education: None    Highest education level: None   Occupational History    None   Tobacco Use    Smoking status: Former Smoker     Packs/day: 1.00     Years: 18.00     Pack years: 18.00     Quit date:      Years since quittin.3    Smokeless tobacco: Never Used Vaping Use    Vaping Use: Never used   Substance and Sexual Activity    Alcohol use: No    Drug use: No    Sexual activity: None   Other Topics Concern    None   Social History Narrative    None     Social Determinants of Health     Financial Resource Strain:     Difficulty of Paying Living Expenses: Not on file   Food Insecurity:     Worried About Running Out of Food in the Last Year: Not on file    [place] of Food in the Last Year: Not on file   Transportation Needs:     Lack of Transportation (Medical): Not on file    Lack of Transportation (Non-Medical): Not on file   Physical Activity:     Days of Exercise per Week: Not on file    Minutes of Exercise per Session: Not on file   Stress:     Feeling of Stress : Not on file   Social Connections:     Frequency of Communication with Friends and Family: Not on file    Frequency of Social Gatherings with Friends and Family: Not on file    Attends Latter day Services: Not on file    Active Member of [address] [place] [address] or Organizations: Not on file    Attends Club or Organization Meetings: Not on file    Marital Status: Not on file   Intimate Partner Violence:     Fear of Current or Ex-Partner: Not on file    Emotionally Abused: Not on file    Physically Abused: Not on file    Sexually Abused: Not on file   Housing Stability:     Unable to Pay for Housing in the Last Year: Not on file    Number of [place] in the Last Year: Not on file    Unstable Housing in the Last Year: Not on file         PHYSICAL EXAM    (up to 7 for level 4, 8 or more for level 5)     ED Triage Vitals [05/07/22 1817]   BP Temp Temp Source Pulse Resp SpO2 Height Weight   (!) 142/90 99 °F (37.2 °C) Oral 81 16 98 % -- --       General: Alert morbidly obese female no acute distress. Head: Atraumatic and normocephalic. Eyes: No conjunctival injection. No pallor. Pupils equal round reactive. ENT: Brandon Darter is clear. Oropharynx moist without erythema.   Neck: Supple, nontender, no adenopathy. Heart: Regular rate and rhythm. No murmurs or gallops noted. Lungs: Breath sounds equal bilaterally and clear. Abdomen: Obese, soft, nontender. No masses organomegaly. Bowel sounds are normal.  No flank tenderness. Skin: Warm and dry, good turgor. No pallor or cyanosis. No diaphoresis. Neuro: Awake, alert, oriented. No focal motor deficits. Normal gait. DIFFERENTIAL DIAGNOSIS   Differential includes but is not limited to hemorrhagic cystitis, ureterolithiasis, kidney tumor, bladder tumor, other. DIAGNOSTIC RESULTS     EKG: All EKG's are interpreted by Britt Boykin MD in the absence of a cardiologist.      RADIOLOGY:   Non-plain film images such as CT, Ultrasound and MRI are read by the radiologist. Plain radiographic images are visualized and preliminarily interpreted Britt Boykin MD with the below findings:      Interpretation per the Radiologist below, if available at the time of this note:    CT ABDOMEN PELVIS W IV CONTRAST Additional Contrast? None   Final Result   Questionable thickening of the sigmoid colon which may reflect colitis in the   correct clinical setting. No evidence of obstructive uropathy.                ED BEDSIDE ULTRASOUND:   Performed by ED Physician - none    LABS:  Labs Reviewed   URINALYSIS WITH REFLEX TO CULTURE - Abnormal; Notable for the following components:       Result Value    Clarity, UA CLOUDY (*)     Bilirubin Urine MODERATE (*)     Ketones, Urine TRACE (*)     Blood, Urine LARGE (*)     Protein, UA >=300 (*)     All other components within normal limits   MICROSCOPIC URINALYSIS - Abnormal; Notable for the following components:    Mucus, UA 1+ (*)     RBC, UA >100 (*)     All other components within normal limits   CBC WITH AUTO DIFFERENTIAL - Abnormal; Notable for the following components:    Macrocytes Occasional (*)     All other components within normal limits   COMPREHENSIVE METABOLIC PANEL - Abnormal; Notable for the following components:    Glucose 130 (*)     BUN 23 (*)     Total Bilirubin 1.2 (*)     All other components within normal limits   PROTIME-INR - Abnormal; Notable for the following components:    Protime 22.0 (*)     All other components within normal limits       All other labs were within normal range or not returned as of this dictation. EMERGENCY DEPARTMENT COURSE and DIFFERENTIAL DIAGNOSIS/MDM:   Vitals:    Vitals:    05/07/22 1817   BP: (!) 142/90   Pulse: 81   Resp: 16   Temp: 99 °F (37.2 °C)   TempSrc: Oral   SpO2: 98%   Weight: 212 lb 4.9 oz (96.3 kg)   Height: 5' 3\" (1.6 m)       This patient presents with intermittent hematuria for the last 2 weeks. She has chronic back pain. She has no abdominal pain. She has no dysuria or frequency. She is not on any blood thinners other than a baby aspirin daily. Her exam is benign. Her H&H is normal.  Her white blood cell count is normal.  Her platelet count is normal.  Her renal function is normal.  Her urine shows greater than 100 red cells with 3-5 white cells. Nitrate negative. Her CT abdomen pelvis shows some questionable thickening of the sigmoid colon. No evidence of obstructive uropathy. Her urine will be cultured. I am going to cover her with antibiotics at this point in time. She understands that the etiology of her hematuria is not determined at this time. No significant pyuria, nitrate negative, I think it is unlikely that it is infectious/hemorrhagic cystitis. I think she would likely need further work-up by urology. She was given urology referral.  The radiologist also mentioned that her sigmoid colon might be a little thickened. I recommended she follow-up with her primary care provider for a GI referral for colonoscopy if she has not had one done recently. Test results, diagnosis, and treatment plan were aby with the patient.   She understands her treatment plan follow-up as discussed    CONSULTS:  None    PROCEDURES:  None    FINAL IMPRESSION      1.  Gross hematuria          DISPOSITION/PLAN   DISPOSITION Decision To Discharge 05/07/2022 08:06:13 PM      PATIENT REFERRED TO:  Maynor Bowens, [address]  [address]    Schedule an appointment as soon as possible for a visit in 3 days        DISCHARGE MEDICATIONS:  New Prescriptions    CEFUROXIME (CEFTIN) 250 MG TABLET    Take 1 tablet by mouth 2 times daily for 7 days       (Please note that portions of this note were completed with a voice recognition program.  Efforts were made to edit the dictations but occasionally words are mis-transcribed.)    Mary Lou Peralta MD  Attending Emergency Physician        Lyssa Iniguez MD  05/07/22 2012

## 2022-05-09 LAB — URINE CULTURE, ROUTINE: NORMAL

## 2022-06-14 ENCOUNTER — TELEPHONE (OUTPATIENT)
Dept: PULMONOLOGY | Age: 62
End: 2022-06-14

## 2022-06-14 NOTE — TELEPHONE ENCOUNTER
Patient called stating she needs a new order for supplies. Told patient [facility] would need to call [facility] to get that order that was placed in January 2022.

## 2022-06-23 ENCOUNTER — TELEPHONE (OUTPATIENT)
Dept: PULMONOLOGY | Age: 62
End: 2022-06-23

## 2022-06-23 NOTE — TELEPHONE ENCOUNTER
Pt called and said her machine is making noises and would like a new machine. She said she called her dme company and they said she needed a new order. Call her to let her know.     Ph # [phone]

## 2022-06-24 NOTE — TELEPHONE ENCOUNTER
Called patient and let her know her order was sent and if she have any question call us back. 06/24/22      Order sent.

## 2022-06-24 NOTE — PROGRESS NOTES
Diagnosis: [x] GILES (G47.33) [] CSA (G47.31) [] Apnea (G47.30)   Length of Need: [x] 15 Months [] 99 Months [] Other:   Machine (SARA!): [] Respironics Dream Station      Auto [x] ResMed AirSense     Auto 11 [] Other:     [x]  CPAP () [] Bilevel ()   Mode: [x] Auto [] Spontaneous    Mode: [] Auto [] Spontaneous          APAP - Settings  Pressure Min: 17 cmH2O  Pressure Max: 20 cmH2O                             Comfort Settings: Ramp Pressure: 5 cmH2O  Ramp time: 15 min  Flex/EPR - 3 full time                                  For ResMed Bileve (TiMax-4 sec   TiMin- 0.2 sec)     Humidifier: [x] Heated ()        [x] Water chamber replacement ()/ 1 per 6 months        Mask:   [x] Nasal () /1 per 3 months [] Full Face () /1 per 3 months   [x] Patient choice -Size and fit mask [] Patient Choice - Size and fit mask   [] Dispense: [] Dispense:   [x] Headgear () / 1 per 3 months [] Headgear () / 1 per 3 months   [x] Replacement Nasal Cushion ()/2 per month [] Interface Replacement ()/1 per month   [] Replacement Nasal Pillows ()/2 per month         Tubing: [x] Heated ()/1 per 3 months    [] Standard ()/1 per 3 months [] Other:           Filters: [x] Non-disposable ()/1 per 6 months     [x] Ultra-Fine, Disposable ()/2 per month        Miscellaneous: [] Chin Strap ()/ 1 per 6 months [] O2 bleed-in:        LPM   [] Oxymetry on CPAP/Bilevel []  Other:         Start Order Date: 06/23/22    MEDICAL JUSTIFICATION:  I, the undersigned, certify that the above prescribed supplies are medically necessary for this patients wellbeing. In my opinion, the supplies are both reasonable and necessary in reference to accepted standards of medicalpractice in treatment of this patients condition.     Daysi Alvarez NP    NPI: [id number]       Order Signed Date: 06/23/22  350 [facility]  Pulmonary, Sleep, and Critical Care    Pulmonary, Sleep, and Critical Care  [address] [facility]. Suite [facility], [address], [address]                                    Jose Tejeda  Phone: [phone]    Fax: [address]  1960  [facility]  [place] [address]  [phone] (home)   [phone] (mobile)      Insurance Info (confirm with patient if correct):  Payor/Plan Subscr  Sex Relation Sub.  Ins. ID Effective Group Num

## 2022-07-25 ENCOUNTER — TELEMEDICINE (OUTPATIENT)
Dept: PULMONOLOGY | Age: 62
End: 2022-07-25
Payer: COMMERCIAL

## 2022-07-25 DIAGNOSIS — E66.01 MORBID OBESITY WITH BMI OF 40.0-44.9, ADULT (HCC): Chronic | ICD-10-CM

## 2022-07-25 DIAGNOSIS — E11.9 CONTROLLED TYPE 2 DIABETES MELLITUS WITHOUT COMPLICATION, UNSPECIFIED WHETHER LONG TERM INSULIN USE (HCC): Chronic | ICD-10-CM

## 2022-07-25 DIAGNOSIS — G47.33 OBSTRUCTIVE SLEEP APNEA: Primary | Chronic | ICD-10-CM

## 2022-07-25 DIAGNOSIS — I10 ESSENTIAL HYPERTENSION: Chronic | ICD-10-CM

## 2022-07-25 PROCEDURE — 99214 OFFICE O/P EST MOD 30 MIN: CPT | Performed by: NURSE PRACTITIONER

## 2022-07-25 PROCEDURE — 2022F DILAT RTA XM EVC RTNOPTHY: CPT | Performed by: NURSE PRACTITIONER

## 2022-07-25 PROCEDURE — G8417 CALC BMI ABV UP PARAM F/U: HCPCS | Performed by: NURSE PRACTITIONER

## 2022-07-25 PROCEDURE — 3046F HEMOGLOBIN A1C LEVEL >9.0%: CPT | Performed by: NURSE PRACTITIONER

## 2022-07-25 PROCEDURE — G8427 DOCREV CUR MEDS BY ELIG CLIN: HCPCS | Performed by: NURSE PRACTITIONER

## 2022-07-25 PROCEDURE — 1036F TOBACCO NON-USER: CPT | Performed by: NURSE PRACTITIONER

## 2022-07-25 PROCEDURE — 3017F COLORECTAL CA SCREEN DOC REV: CPT | Performed by: NURSE PRACTITIONER

## 2022-07-25 ASSESSMENT — SLEEP AND FATIGUE QUESTIONNAIRES
ESS TOTAL SCORE: 1
HOW LIKELY ARE YOU TO NOD OFF OR FALL ASLEEP WHILE WATCHING TV: 1
HOW LIKELY ARE YOU TO NOD OFF OR FALL ASLEEP WHILE SITTING QUIETLY AFTER LUNCH WITHOUT ALCOHOL: 0
HOW LIKELY ARE YOU TO NOD OFF OR FALL ASLEEP WHILE SITTING AND READING: 0
HOW LIKELY ARE YOU TO NOD OFF OR FALL ASLEEP IN A CAR, WHILE STOPPED FOR A FEW MINUTES IN TRAFFIC: 0
HOW LIKELY ARE YOU TO NOD OFF OR FALL ASLEEP WHILE SITTING AND TALKING TO SOMEONE: 0
HOW LIKELY ARE YOU TO NOD OFF OR FALL ASLEEP WHEN YOU ARE A PASSENGER IN A CAR FOR AN HOUR WITHOUT A BREAK: 0
HOW LIKELY ARE YOU TO NOD OFF OR FALL ASLEEP WHILE LYING DOWN TO REST IN THE AFTERNOON WHEN CIRCUMSTANCES PERMIT: 0
HOW LIKELY ARE YOU TO NOD OFF OR FALL ASLEEP WHILE SITTING INACTIVE IN A PUBLIC PLACE: 0

## 2022-07-25 NOTE — PROGRESS NOTES
Diagnosis: [x] GILES (G47.33) [] CSA (G47.31) [] Apnea (G47.30)   Length of Need: [x] 15 Months [] 99 Months [] Other:   Machine (SARA!): [] Respironics Dream Station      Auto [x] ResMed AirSense  11   Auto [] Other:     [x]  CPAP () [] Bilevel ()   Mode: [x] Auto [] Spontaneous    Mode: [] Auto [] Spontaneous          APAP - Settings  Pressure Min: 17 cmH2O  Pressure Max: 20 cmH2O               Comfort Settings: Ramp Pressure: 5 cmH2O  Ramp time: 15 min  Flex/EPR - 3 full time                                  For ResMed Bileve (TiMax-4 sec   TiMin- 0.2 sec)     Humidifier: [x] Heated ()        [x] Water chamber replacement ()/ 1 per 6 months        Mask:   [x] Nasal () /1 per 3 months [] Full Face () /1 per 3 months   [x] Patient choice -Size and fit mask [] Patient Choice - Size and fit mask   [] Dispense: [] Dispense:   [x] Headgear () / 1 per 3 months [] Headgear () / 1 per 3 months   [x] Replacement Nasal Cushion ()/2 per month [] Interface Replacement ()/1 per month   [] Replacement Nasal Pillows ()/2 per month         Tubing: [x] Heated ()/1 per 3 months    [] Standard ()/1 per 3 months [] Other:           Filters: [x] Non-disposable ()/1 per 6 months     [x] Ultra-Fine, Disposable ()/2 per month        Miscellaneous: [] Chin Strap ()/ 1 per 6 months [] O2 bleed-in:        LPM   [] Oxymetry on CPAP/Bilevel []  Other:         Start Order Date: 07/25/22    MEDICAL JUSTIFICATION:  I, the undersigned, certify that the above prescribed supplies are medically necessary for this patients wellbeing. In my opinion, the supplies are both reasonable and necessary in reference to accepted standards of medicalpractice in treatment of this patients condition.     Alexx Zavala NP    NPI: [id number]       Order Signed Date: 07/25/22  350 [facility]  Pulmonary, Sleep, and Critical Care Pulmonary, Sleep, and Critical Care  [address] [facility]. Suite [facility], [facility] , [address]                                    [facility]  Phone: [phone]    Fax: [address] [facility]  1960  [address] [id number] [phone] (home)   [phone] (mobile)      Insurance Info (confirm with patient if correct):  Payer/Plan Subscr  Sex Relation Sub.  Ins. ID Effective Group Num

## 2022-07-25 NOTE — ASSESSMENT & PLAN NOTE
Chronic-Stable: Reviewed and analyzed results of physiologic download from patient's machine and reviewed with patient. Supplies and parts as needed for her machine. These are medically necessary. Limit caffeine use after 3pm. Based on the analyzed data will continue with current settings. Will resend order for new machine. Patient reports her machine is making louder noises and would like to be repaired. I checked our office and found there is a nasal cushion if she would like to try the smaller size or different style of mask. Patient declines at this time and reports she will contact the office if unable to get a hold of the equipment company. Will see her back for new machine compliance between 30-90 days. Encouraged her to contact the office sooner with any questions or concerns.

## 2022-07-25 NOTE — PROGRESS NOTES
[facility]  [id number] [place] [address], [address] [facility]- [phone]   [facility] Obdulio Holt. [address] [place] [address]. Ricki Hameed 37 [phone]     Video Visit- Follow up    Jerona Sever, was evaluated through a synchronous (real-time) audio-video  encounter. The patient (or guardian if applicable) is aware that this is a billable  service, which includes applicable co-pays. This Virtual Visit was conducted with  patient's (and/or legal guardian's) consent. The visit was conducted pursuant to  the emergency declaration under the [facility], [address] [place] [address] wa[facility] authority and the [employer] and  [employer] General Act. Patient identification was verified,  and a caregiver was present when appropriate. The patient was located in a  state where the provider was licensed to provide care. Assessment/Plan:      1. Obstructive sleep apnea  Assessment & Plan:  Chronic-Stable: Reviewed and analyzed results of physiologic download from patient's machine and reviewed with patient. Supplies and parts as needed for her machine. These are medically necessary. Limit caffeine use after 3pm. Based on the analyzed data will continue with current settings. Will resend order for new machine. Patient reports her machine is making louder noises and would like to be repaired. I checked our office and found there is a nasal cushion if she would like to try the smaller size or different style of mask. Patient declines at this time and reports she will contact the office if unable to get a hold of the equipment company. Will see her back for new machine compliance between 30-90 days. Encouraged her to contact the office sooner with any questions or concerns. 2. Essential hypertension  Assessment & Plan:   Chronic- Stable. Discussed the importance of treating obstructive sleep apnea as part of the management of this disorder. Cont any meds per PCP and other physicians. 3. Controlled type 2 diabetes mellitus without complication, unspecified whether long term insulin use ([address]  Assessment & Plan:  Chronic- Stable. Discussed the importance of treating obstructive sleep apnea as part of the management of this disorder. Cont any meds per PCP and other physicians. 4. Morbid obesity with BMI of 40.0-44.9, [facility])  Assessment & Plan:   Chronic-not stable:  Discussed importance of treating obstructive sleep apnea and getting sufficient sleep to assist with weight control. Encouraged her to work on weight loss through diet and exercise. Recommended DASH or Mediterranean diets. Reviewed, analyzed, and documented physiologic data from patient's PAP machine. This information was analyzed to assess complexity and medical decision making in regards to further testing and management. The primary encounter diagnosis was Obstructive sleep apnea. Diagnoses of Essential hypertension, Controlled type 2 diabetes mellitus without complication, unspecified whether long term insulin use ([facility] and Morbid obesity with BMI of 40.0-44.9, [facility]) were also pertinent to this visit. The chronic medical conditions listed are directly related to the primary diagnosis listed above. The management of the primary diagnosis affects the secondary diagnosis and vice versa. Subjective:     Patient ID: Negrito Cronin is a 60 y.o. female. Chief Complaint   Patient presents with    Sleep Apnea     Subjective   HPI:    Machine Modem/Download Info:  Compliance (hours/night): 7.2 hrs/night  % of nights >= 4 hrs: 100 %  Download AHI (/hour): 3.2 /HR  Average CPAP Pressure : 18.1 cmH2O      APAP - Settings  Pressure Min: 17 cmH2O  Pressure Max: 20 cmH2O                 Comfort Settings  Flex/EPR (0-3): 3 PAP Mask  Mask Type: Nasal mask     Katie Ramires reports she is doing well with her machine.   The pressure on her machine is comfortable and she is waking rested. She reports she was never contacted by the equipment company about obtaining a new machine or updated supplies. She is still having trouble with her machine making loud noises. Her machine is not quite 11years old but she was told by her insurance company that they would allow her to get a new machine if it was submitted. She had weight loss surgery in December, 2021 and has lost over 80 pounds. she denies headaches, congestion, nosebleeds, dryness, aerophagia, or drowsiness while driving. Her mask is cracked and she has been taping it.      [facility]    [place] - Total score: 1    Current Outpatient Medications   Medication Instructions    albuterol sulfate HFA (PROVENTIL;VENTOLIN;PROAIR) 108 (90 Base) MCG/ACT inhaler 2 puffs, Inhalation    aspirin 81 mg, Oral, DAILY    busPIRone (BUSPAR) 10 mg, Oral, 2 TIMES DAILY    Cholecalciferol (VITAMIN D3) 2000 UNITS CAPS 1 tablet, Oral, 2 TIMES DAILY    escitalopram (LEXAPRO) 20 mg, Oral    losartan (COZAAR) 12.5 mg, Oral, DAILY    Morphine Sulfate (MORPHINE 20 MG/ML ORAL SOLN 0.25 ML, SMALL, CMPD) 20 mg, 2 TIMES DAILY    Multiple Vitamin (MULTI-VITAMIN DAILY PO) Oral    pantoprazole (PROTONIX) 40 mg, Oral, 2 TIMES DAILY    simvastatin (ZOCOR) 20 MG tablet TAKE ONE TABLET BY MOUTH AT BEDTIME    Trulicity 1.5 mg, SubCUTAneous, WEEKLY    vitamin B-12 (CYANOCOBALAMIN) 2,500 mcg, Oral        Electronically signed by DEEPAK Dahl on 7/25/2022 at 4:54 PM

## 2022-07-25 NOTE — LETTER
[facility]  [address] [facility]  [place]  [address]  Phone: [phone]  Fax: [phone]    July 25, 2022       Patient: Brittny Cox   MR Number: [id number]   YOB: 1960   Date of Visit: 7/25/2022       Marcus Garrison was seen for a follow up visit today. Here is my assessment and plan as well as an attached copy of her visit today:    Essential hypertension   Chronic- Stable. Discussed the importance of treating obstructive sleep apnea as part of the management of this disorder. Cont any meds per PCP and other physicians. Diabetes mellitus type 2, controlled, without complications (HCC)  Chronic- Stable. Discussed the importance of treating obstructive sleep apnea as part of the management of this disorder. Cont any meds per PCP and other physicians. Morbid obesity with BMI of 40.0-44.9, adult ([address]   Chronic-not stable:  Discussed importance of treating obstructive sleep apnea and getting sufficient sleep to assist with weight control. Encouraged her to work on weight loss through diet and exercise. Recommended DASH or Mediterranean diets. Obstructive sleep apnea  Chronic-Stable: Reviewed and analyzed results of physiologic download from patient's machine and reviewed with patient. Supplies and parts as needed for her machine. These are medically necessary. Limit caffeine use after 3pm. Based on the analyzed data will continue with current settings. Will resend order for new machine. Patient reports her machine is making louder noises and would like to be repaired. I checked our office and found there is a nasal cushion if she would like to try the smaller size or different style of mask. Patient declines at this time and reports she will contact the office if unable to get a hold of the equipment company. Will see her back for new machine compliance between 30-90 days.   Encouraged her to contact the office sooner with any questions or

## 2022-11-11 ENCOUNTER — TELEPHONE (OUTPATIENT)
Dept: PULMONOLOGY | Age: 62
End: 2022-11-11

## 2022-11-11 NOTE — TELEPHONE ENCOUNTER
Spoke with patient to discuss appt. On Monday, patient has not received new machine yet and [email or web address]. Patient is not interested in trying to obtain new CPAP now. She states her current machine is now working fine. Patient cancelled [facility], was getting another call, but will call back to schedule a yearly follow up.

## 2023-01-01 NOTE — RESULT ENCOUNTER NOTE
Culture result reviewed, no further treatment needed Patient/Caregiver provided printed discharge information.

## 2023-09-12 ENCOUNTER — HOSPITAL ENCOUNTER (OUTPATIENT)
Age: 63
Discharge: HOME | End: 2023-09-12
Payer: COMMERCIAL

## 2023-09-12 ENCOUNTER — HOSPITAL ENCOUNTER (OUTPATIENT)
Dept: GENERAL RADIOLOGY | Age: 63
Discharge: HOME | End: 2023-09-12
Payer: COMMERCIAL

## 2023-09-12 DIAGNOSIS — M17.12 UNILATERAL PRIMARY OSTEOARTHRITIS, LEFT KNEE: ICD-10-CM

## 2023-09-12 DIAGNOSIS — M17.11 UNILATERAL PRIMARY OSTEOARTHRITIS, RIGHT KNEE: ICD-10-CM

## 2023-09-12 PROCEDURE — 73560 X-RAY EXAM OF KNEE 1 OR 2: CPT

## 2023-11-06 ENCOUNTER — OFFICE VISIT (OUTPATIENT)
Dept: PULMONOLOGY | Age: 63
End: 2023-11-06
Payer: COMMERCIAL

## 2023-11-06 VITALS
OXYGEN SATURATION: 99 % | WEIGHT: 190 LBS | BODY MASS INDEX: 33.66 KG/M2 | DIASTOLIC BLOOD PRESSURE: 82 MMHG | SYSTOLIC BLOOD PRESSURE: 127 MMHG | HEART RATE: 85 BPM | HEIGHT: 63 IN

## 2023-11-06 DIAGNOSIS — E11.9 CONTROLLED TYPE 2 DIABETES MELLITUS WITHOUT COMPLICATION, UNSPECIFIED WHETHER LONG TERM INSULIN USE (HCC): ICD-10-CM

## 2023-11-06 DIAGNOSIS — E66.9 OBESITY (BMI 30.0-34.9): ICD-10-CM

## 2023-11-06 DIAGNOSIS — I10 ESSENTIAL HYPERTENSION: ICD-10-CM

## 2023-11-06 DIAGNOSIS — G47.33 OBSTRUCTIVE SLEEP APNEA: Primary | ICD-10-CM

## 2023-11-06 PROCEDURE — 99214 OFFICE O/P EST MOD 30 MIN: CPT | Performed by: NURSE PRACTITIONER

## 2023-11-06 PROCEDURE — 3079F DIAST BP 80-89 MM HG: CPT | Performed by: NURSE PRACTITIONER

## 2023-11-06 PROCEDURE — G8484 FLU IMMUNIZE NO ADMIN: HCPCS | Performed by: NURSE PRACTITIONER

## 2023-11-06 PROCEDURE — 2022F DILAT RTA XM EVC RTNOPTHY: CPT | Performed by: NURSE PRACTITIONER

## 2023-11-06 PROCEDURE — G8417 CALC BMI ABV UP PARAM F/U: HCPCS | Performed by: NURSE PRACTITIONER

## 2023-11-06 PROCEDURE — 3017F COLORECTAL CA SCREEN DOC REV: CPT | Performed by: NURSE PRACTITIONER

## 2023-11-06 PROCEDURE — 1036F TOBACCO NON-USER: CPT | Performed by: NURSE PRACTITIONER

## 2023-11-06 PROCEDURE — 3046F HEMOGLOBIN A1C LEVEL >9.0%: CPT | Performed by: NURSE PRACTITIONER

## 2023-11-06 PROCEDURE — G8427 DOCREV CUR MEDS BY ELIG CLIN: HCPCS | Performed by: NURSE PRACTITIONER

## 2023-11-06 PROCEDURE — 3074F SYST BP LT 130 MM HG: CPT | Performed by: NURSE PRACTITIONER

## 2023-11-06 RX ORDER — BACLOFEN 10 MG/1
10 TABLET ORAL DAILY
COMMUNITY
Start: 2023-10-23

## 2023-11-06 ASSESSMENT — SLEEP AND FATIGUE QUESTIONNAIRES
HOW LIKELY ARE YOU TO NOD OFF OR FALL ASLEEP WHILE SITTING AND TALKING TO SOMEONE: 0
HOW LIKELY ARE YOU TO NOD OFF OR FALL ASLEEP WHILE WATCHING TV: 2
HOW LIKELY ARE YOU TO NOD OFF OR FALL ASLEEP WHILE SITTING QUIETLY AFTER LUNCH WITHOUT ALCOHOL: 1
HOW LIKELY ARE YOU TO NOD OFF OR FALL ASLEEP WHILE SITTING INACTIVE IN A PUBLIC PLACE: 0
HOW LIKELY ARE YOU TO NOD OFF OR FALL ASLEEP IN A CAR, WHILE STOPPED FOR A FEW MINUTES IN TRAFFIC: 0
HOW LIKELY ARE YOU TO NOD OFF OR FALL ASLEEP WHEN YOU ARE A PASSENGER IN A CAR FOR AN HOUR WITHOUT A BREAK: 1
HOW LIKELY ARE YOU TO NOD OFF OR FALL ASLEEP WHILE LYING DOWN TO REST IN THE AFTERNOON WHEN CIRCUMSTANCES PERMIT: 2
ESS TOTAL SCORE: 7
HOW LIKELY ARE YOU TO NOD OFF OR FALL ASLEEP WHILE SITTING AND READING: 1

## 2023-11-06 NOTE — PROGRESS NOTES
in the morning for the most part. States her mask is fitting well and comfortable with minimal leak. Denies headache, aerophagia, nosebleed, dry mouth, nasal congestion, and drowsiness while driving.      DME [facility]    [place] Sleepiness Score: 7    Social History     Socioeconomic History    Marital status: Single     Spouse name: Not on file    Number of children: Not on file    Years of education: Not on file    Highest education level: Not on file   Occupational History    Not on file   Tobacco Use    Smoking status: Former     Packs/day: 1.00     Years: 18.00     Additional pack years: 0.00     Total pack years: 18.00     Types: Cigarettes     Quit date: 35     Years since quittin.8    Smokeless tobacco: Never   Vaping Use    Vaping Use: Never used   Substance and Sexual Activity    Alcohol use: No    Drug use: No    Sexual activity: Not on file   Other Topics Concern    Not on file   Social History Narrative    Not on file     Social Determinants of Health     Financial Resource Strain: Not on file   Food Insecurity: Not on file   Transportation Needs: Not on file   Physical Activity: Not on file   Stress: Not on file   Social Connections: Not on file   Intimate Partner Violence: Not on file   Housing Stability: Not on file       Current Outpatient Medications   Medication Instructions    albuterol sulfate HFA (PROVENTIL;VENTOLIN;PROAIR) 108 (90 Base) MCG/ACT inhaler 2 puffs, Inhalation    aspirin 81 mg, Oral, DAILY    baclofen (LIORESAL) 10 mg, Oral, DAILY    busPIRone (BUSPAR) 10 mg, Oral, 2 TIMES DAILY    Cholecalciferol (VITAMIN D3) 2000 UNITS CAPS 1 tablet, Oral, 2 TIMES DAILY    escitalopram (LEXAPRO) 20 mg, Oral    losartan (COZAAR) 12.5 mg, Oral, DAILY    Morphine Sulfate (MORPHINE 20 MG/ML ORAL SOLN 0.25 ML, SMALL, CMPD) 15 mg, Oral, 3 times daily    Multiple Vitamin (MULTI-VITAMIN DAILY PO) Oral    pantoprazole (PROTONIX) 40 mg, Oral, 2 TIMES DAILY    simvastatin (ZOCOR) 20

## 2023-11-06 NOTE — ASSESSMENT & PLAN NOTE
Chronic - Stable: Reviewed and analyzed results of physiologic download from patient's machine and reviewed with patients. Supplies and parts as needed for the machine. These are medically necessary. Limit caffeine use after 3 PM. Based on the analyzed data, we will continue with current settings. She will need a new machine as the humidifier is not working consistently, causing dryness in her nose. She is also averaging pressure of 18.4 cmH2O, reaching the max pressure of 20 on some days. Based on this, we will order a bilevel machine to accommodate her pressure needs. She has been compliant with use of the machine and is getting good symptom control. She is getting benefit from use of the machine and this is medically necessary. We will send a prescription for the bilevel machine. She will return to the office between 31 to 90 days once she is set up with the new bilevel machine for progress check and for insurance compliance check. She was instructed to continue to use the machine as much as possible until she is set up with the new machine. Encouraged the patient to contact the office with any questions or concerns.

## 2023-11-06 NOTE — PROGRESS NOTES
Ike  1960  [facility]  [phone] (home)   [phone] (mobile)      Insurance Info (confirm with patient if correct):  Payer/Plan Subscr  Sex Relation Sub.  Ins. ID Effective Group Num

## 2023-11-27 ENCOUNTER — HOSPITAL ENCOUNTER (OUTPATIENT)
Dept: GENERAL RADIOLOGY | Age: 63
Discharge: HOME | End: 2023-11-27
Payer: COMMERCIAL

## 2023-11-27 ENCOUNTER — HOSPITAL ENCOUNTER (OUTPATIENT)
Age: 63
Discharge: HOME | End: 2023-11-27
Payer: COMMERCIAL

## 2023-11-27 DIAGNOSIS — M47.896 OTHER SPONDYLOSIS, LUMBAR REGION: ICD-10-CM

## 2023-11-27 PROCEDURE — 72100 X-RAY EXAM L-S SPINE 2/3 VWS: CPT

## 2023-12-01 ENCOUNTER — TELEPHONE (OUTPATIENT)
Dept: PULMONOLOGY | Age: 63
End: 2023-12-01

## 2023-12-01 NOTE — TELEPHONE ENCOUNTER
Pt called in stating that she has not gotten her machine yet and knows it got approved by \"the other place not [employer]\" [facility] and pt stated that her order was not sent there it was sent to a different company. Listed some DME company names and pt stated that none of those were the correct company. Per pt, [facility] no record of the order.      [phone]

## 2023-12-01 NOTE — TELEPHONE ENCOUNTER
Spoke with patient and [facility] and [email or web address] are the same company. Told patient it was approved and she should call us next week if she doesn't hear something from [address].

## 2023-12-14 ENCOUNTER — TELEPHONE (OUTPATIENT)
Dept: PULMONOLOGY | Age: 63
End: 2023-12-14

## 2023-12-14 NOTE — TELEPHONE ENCOUNTER
Pt called in stating that she is unhappy with [employer] and wants her order sent somewhere else. Advised pt to call her insurance to see what companies are covered. Pt verbalized understanding.

## 2024-08-15 ENCOUNTER — OFFICE VISIT (OUTPATIENT)
Dept: SURGERY | Age: 64
End: 2024-08-15
Payer: MEDICARE

## 2024-08-15 VITALS
HEIGHT: 63 IN | SYSTOLIC BLOOD PRESSURE: 124 MMHG | BODY MASS INDEX: 36.86 KG/M2 | DIASTOLIC BLOOD PRESSURE: 85 MMHG | TEMPERATURE: 98.6 F | OXYGEN SATURATION: 98 % | WEIGHT: 208 LBS

## 2024-08-15 DIAGNOSIS — M79.3 PANNICULITIS: Primary | ICD-10-CM

## 2024-08-15 PROCEDURE — 3079F DIAST BP 80-89 MM HG: CPT

## 2024-08-15 PROCEDURE — 1123F ACP DISCUSS/DSCN MKR DOCD: CPT

## 2024-08-15 PROCEDURE — 3074F SYST BP LT 130 MM HG: CPT

## 2024-08-15 PROCEDURE — 99204 OFFICE O/P NEW MOD 45 MIN: CPT

## 2024-08-15 NOTE — PROGRESS NOTES
[facility]    Consultation requested by: Self     CC: Body contouring    HPI: 64 y.o. female with a PMHx as delineated below who presents in consultation for body contouring. Since she has hanging skin of upper arms and hanging skin of abdomen and she has tried OTC treatments for the rashes without improvement.  She wishes to discuss options for improving function of hanging abdomen skin.     Bariatric surgery: Yes  / date: 5/3/21 (Type: Unsure UC )    Max weight (lbs): 298  Lowest weight (lbs): 178  Current (lbs): 208  Weight change in the past 3 months: No  Max BMI: 52.8  Current BMI: Body mass index is 36.85 kg/m².    History of DVT/PE: No  Excess skin cover genitals: Yes    Previous body contouring procedures: Yes Location: [facility] in her 40s  Smoking: No    Pregnancy / Miscarriage: 2/0    Past Medical History:   Diagnosis Date    Anxiety     Diabetes mellitus (HCC)     Hyperlipidemia     Hypertension     Obstructive sleep apnea     Obstructive sleep apnea (adult) (pediatric)     Paroxysmal SVT (supraventricular tachycardia) (HCC)    Hemoglobin A1C 5.1 5/10/24    Past Surgical History:   Procedure Laterality Date    ANKLE FUSION      BREAST SURGERY Bilateral     reduction    FRACTURE SURGERY Left     ankle    GASTRIC BYPASS SURGERY      HYSTERECTOMY (CERVIX STATUS UNKNOWN)      ROTATOR CUFF REPAIR       Social History     Socioeconomic History    Marital status: Single     Spouse name: Not on file    Number of children: Not on file    Years of education: Not on file    Highest education level: Not on file   Occupational History    Not on file   Tobacco Use    Smoking status: Former     Current packs/day: 0.00     Average packs/day: 1 pack/day for 18.0 years (18.0 ttl pk-yrs)     Types: Cigarettes     Start date:      Quit date:      Years since quittin.6    Smokeless tobacco: Never   Vaping Use    Vaping status: Never Used   Substance and Sexual Activity    Alcohol use: No

## 2024-09-19 ENCOUNTER — HOSPITAL ENCOUNTER (OUTPATIENT)
Dept: MRI IMAGING | Age: 64
Discharge: HOME | End: 2024-09-19
Payer: MEDICARE

## 2024-09-19 DIAGNOSIS — M54.16 LUMBAR RADICULOPATHY: ICD-10-CM

## 2024-09-19 PROCEDURE — 72148 MRI LUMBAR SPINE W/O DYE: CPT

## 2024-10-16 ENCOUNTER — OFFICE VISIT (OUTPATIENT)
Dept: SURGERY | Age: 64
End: 2024-10-16
Payer: MEDICARE

## 2024-10-16 VITALS
RESPIRATION RATE: 16 BRPM | WEIGHT: 203.4 LBS | DIASTOLIC BLOOD PRESSURE: 85 MMHG | HEIGHT: 63 IN | SYSTOLIC BLOOD PRESSURE: 124 MMHG | HEART RATE: 68 BPM | BODY MASS INDEX: 36.04 KG/M2

## 2024-10-16 DIAGNOSIS — M79.3 PANNICULITIS: Primary | ICD-10-CM

## 2024-10-16 PROCEDURE — 3079F DIAST BP 80-89 MM HG: CPT | Performed by: SURGERY

## 2024-10-16 PROCEDURE — 1123F ACP DISCUSS/DSCN MKR DOCD: CPT | Performed by: SURGERY

## 2024-10-16 PROCEDURE — 3074F SYST BP LT 130 MM HG: CPT | Performed by: SURGERY

## 2024-10-16 PROCEDURE — 99214 OFFICE O/P EST MOD 30 MIN: CPT | Performed by: SURGERY

## 2024-10-16 RX ORDER — MORPHINE SULFATE 15 MG/1
TABLET, FILM COATED, EXTENDED RELEASE ORAL
COMMUNITY
Start: 2024-10-10

## 2024-10-16 RX ORDER — DULAGLUTIDE 0.75 MG/.5ML
INJECTION, SOLUTION SUBCUTANEOUS
COMMUNITY
Start: 2024-09-24

## 2024-10-16 RX ORDER — NYSTATIN 100000 U/G
CREAM TOPICAL 2 TIMES DAILY
COMMUNITY
Start: 2024-08-23

## 2024-10-16 RX ORDER — PEN NEEDLE, DIABETIC 31 GX5/16"
NEEDLE, DISPOSABLE MISCELLANEOUS
COMMUNITY
Start: 2024-09-06

## 2024-10-16 RX ORDER — HYDROXYZINE HYDROCHLORIDE 25 MG/1
25 TABLET, FILM COATED ORAL EVERY 6 HOURS PRN
COMMUNITY
Start: 2024-08-23

## 2024-10-16 RX ORDER — BUSPIRONE HYDROCHLORIDE 30 MG/1
TABLET ORAL
COMMUNITY
Start: 2024-07-27

## 2024-10-16 RX ORDER — LOSARTAN POTASSIUM 50 MG/1
TABLET ORAL
COMMUNITY
Start: 2024-09-01

## 2024-10-16 NOTE — PROGRESS NOTES
67   Resp 16   Ht 1.6 m (5' 2.99\")   Wt 92.3 kg (203 lb 6.4 oz)   BMI 36.04 kg/m²      GEN: NAD  CVS: RRR  PULM: No respiratory distress  HEENT: PERRLA/EOMI; dentition, hearing appears within normal limits  NECK: Supple with trachea in midline, no masses  ABD: soft/NT/ND, GRADE 2 PANNUS with rash  EXT: No lymphedema noted  NEURO: No focal deficits, no obvious CN deficits     IMP: 64 y.o. female with desire for body contouring  PLAN: Would benefit from a panniculectomy to improve function and skin to skin contact. However, she will need to lose some additional weight. Will follow-up in 3 months.      The complexity of body contouring procedures and wound healing physiology were discussed with the patient.  She was counseled on ideal medical optimization (nutrition, weight stability, etc.).  We also discussed the pros & cons of staging for multiple procedures including controlling tension from various sites and postoperative care managment. Clinical photos were obtained. The risks, benefits, alternatives, outcomes, personnel involved were discussed and all questions were answered in a satisfactory manner according to the patient. Specifically,the risks including, but not limited to: bleeding possibly requiring transfusion orreoperation, infection, seroma, nonhealing of wounds, poor cosmetic outcome, scarring, VTE (DVT/PE), and death were discussed.       All Royal MD  [facility] [place] [facility]  [phone]  10/16/24

## 2025-01-13 ENCOUNTER — OFFICE VISIT (OUTPATIENT)
Dept: SURGERY | Age: 65
End: 2025-01-13
Payer: MEDICARE

## 2025-01-13 VITALS
HEART RATE: 80 BPM | HEIGHT: 63 IN | WEIGHT: 196.9 LBS | SYSTOLIC BLOOD PRESSURE: 109 MMHG | RESPIRATION RATE: 16 BRPM | BODY MASS INDEX: 34.89 KG/M2 | DIASTOLIC BLOOD PRESSURE: 74 MMHG

## 2025-01-13 DIAGNOSIS — M79.3 PANNICULITIS: Primary | ICD-10-CM

## 2025-01-13 PROCEDURE — 99214 OFFICE O/P EST MOD 30 MIN: CPT | Performed by: SURGERY

## 2025-01-13 PROCEDURE — 3074F SYST BP LT 130 MM HG: CPT | Performed by: SURGERY

## 2025-01-13 PROCEDURE — 1123F ACP DISCUSS/DSCN MKR DOCD: CPT | Performed by: SURGERY

## 2025-01-13 PROCEDURE — 3078F DIAST BP <80 MM HG: CPT | Performed by: SURGERY

## 2025-01-13 RX ORDER — SODIUM CHLORIDE 9 MG/ML
INJECTION, SOLUTION INTRAVENOUS PRN
OUTPATIENT
Start: 2025-01-13

## 2025-01-13 RX ORDER — SODIUM CHLORIDE, SODIUM LACTATE, POTASSIUM CHLORIDE, CALCIUM CHLORIDE 600; 310; 30; 20 MG/100ML; MG/100ML; MG/100ML; MG/100ML
INJECTION, SOLUTION INTRAVENOUS CONTINUOUS
OUTPATIENT
Start: 2025-01-13

## 2025-01-13 RX ORDER — SODIUM CHLORIDE 0.9 % (FLUSH) 0.9 %
5-40 SYRINGE (ML) INJECTION PRN
OUTPATIENT
Start: 2025-01-13

## 2025-01-13 RX ORDER — HEPARIN SODIUM 5000 [USP'U]/ML
5000 INJECTION, SOLUTION INTRAVENOUS; SUBCUTANEOUS
OUTPATIENT
Start: 2025-01-13 | End: 2025-01-13

## 2025-01-13 RX ORDER — SODIUM CHLORIDE 0.9 % (FLUSH) 0.9 %
5-40 SYRINGE (ML) INJECTION EVERY 12 HOURS SCHEDULED
OUTPATIENT
Start: 2025-01-13

## 2025-01-13 NOTE — PROGRESS NOTES
Start date:        Quit date:        Years since quittin.6    Smokeless tobacco: Never   Vaping Use    Vaping status: Never Used   Substance and Sexual Activity    Alcohol use: No    Drug use: No    Sexual activity: Not on file   Other Topics Concern    Not on file   Social History Narrative    Not on file      Social Determinants of Health           Financial Resource Strain: Not on file   Food Insecurity: Unknown (2024)     Received from [employer]      Food Insecurities      Worried about running out of food: Not on file      Food Bought: Not on file   Transportation Needs: Unknown (2024)     Received from [employer]      Transportation      Worried about transportation: Not on file   Physical Activity: Not on file   Stress: Not on file   Social Connections: Not on file   Intimate Partner Violence: Unknown (2024)     Received from [employer]      Interpersonal Safety      Feel physically or emotionally unsafe where currently live: Not on file      Harm by anyone: Not on file      Emotionally Harmed: Not on file   Housing Stability: Unknown (2024)     Received from [employer]      Housing/Utilities      Worried about losing home: Not on file      Stayed outside house: Not on file      Unable to get utilities: Not on file         Family History         Family History   Problem Relation Age of Onset    Sleep Apnea Sister      Sleep Apnea Daughter              Allergy:   Allergies   No Known Allergies        ROS History obtained from the patient  General ROS: negative  Psychological ROS: negative  Ophthalmic ROS: negative  Neurological ROS: negative  ENT ROS: negative  Allergy and Immunology ROS: negative  Hematological and Lymphatic ROS: negative  Endocrine ROS: negative  Respiratory ROS: negative  Cardiovascular ROS: negative  Gastrointestinal ROS: See HPI  Genito-Urinary ROS: negative  Musculoskeletal ROS: negative   Skin ROS: See HPI.     EXAM     /74   Pulse 80   Resp 16

## 2025-01-14 ENCOUNTER — TELEPHONE (OUTPATIENT)
Dept: SURGERY | Age: 65
End: 2025-01-14

## 2025-01-14 NOTE — TELEPHONE ENCOUNTER
I returned the patients call mentioned below to discuss needed medical records. The patient stated that she has seen her PCP about the hanging skin of abdomen. The PCP prescribed medication for her in regards to her rash. The patient was provided our fax number and aware that I will reach out to her when I receive the records.     I will leave this phone note open.

## 2025-01-14 NOTE — TELEPHONE ENCOUNTER
The patient was in the office to see Dr. Peterson yesterday.    PLAN: Would benefit from a panniculectomy to improve function and skin to skin contact. She understands the difference between a panniculectomy and abdominoplasty and desires to proceed.     I received a surgery letter.     I spoke with the patient at the home number listed to discuss needed medical records. The patient said that she is driving and asked to call me back.    I will leave this phone note open.

## 2025-01-16 NOTE — TELEPHONE ENCOUNTER
I received faxed medical records from [facility]. The records are in my drawer.     I tried to call the patient at the home number listed to discuss the records that I received. No answer and a mailbox that has not been set up yet.     I will leave this phone note open.

## 2025-02-04 NOTE — TELEPHONE ENCOUNTER
I returned the patients call mentioned below. She stated that the office visit note that I received from  is the only note. She stated that it is the only thing I should expect. She is aware that I will reach out to Shira tomorrow and begin pre certification.    I will leave this phone note open.

## 2025-02-04 NOTE — TELEPHONE ENCOUNTER
Patient called in wanting to know the status of her prior authorization     Please contact the patient at [phone]

## 2025-02-05 NOTE — TELEPHONE ENCOUNTER
I spoke with Paul AUGUSTINE at [employer] ([phone]) to see if CPT Code 32238 requires pre certification. The code does require pre certification, which I started during our call. I will fax clinicals and colored pictures to [phone].  I will scan the clinicals that I faxed and the fax success into Epic under the media tab, but I am not able to scan colored pictures.     Date Range 2- to     Pending Reference # [id number]     I lmom for the patient at the home number listed to provide an insurance update.     I will leave this phone note open.

## 2025-02-18 ENCOUNTER — PREP FOR PROCEDURE (OUTPATIENT)
Dept: SURGERY | Age: 65
End: 2025-02-18

## 2025-02-18 DIAGNOSIS — M79.3 PANNICULITIS: ICD-10-CM

## 2025-02-18 NOTE — TELEPHONE ENCOUNTER
I returned the patients call mentioned below. The patient is now scheduled for surgery with  on 2-.     The patient is aware of H&P.    The patient is scheduled for her post op appointment 3-.    I will submit the case request to [facility].     I will fax the surgery information and instructions to the patients PCP per her request. I will fax the information to [phone] Dr. Talamantes. I will scan the information that I faxed and the fax success into Epic under the media tab.     I will close this phone note.

## 2025-02-18 NOTE — TELEPHONE ENCOUNTER
The patient returned the call mentioned below to discuss insurance and schedule surgery.    Please call: [phone]

## 2025-02-18 NOTE — TELEPHONE ENCOUNTER
I spoke with Amber AKERS at [employer] ([phone]) to follow up on Pending Reference # [id number].     APPROVED  Reference # [id number]  CPT Code 34909  Date Range 2- to 8-  Sandro montague for the patient at the home number listed. I requested a call back to discuss insurance and surgery scheduling.       I will leave this phone note open.

## 2025-02-19 NOTE — H&P (VIEW-ONLY)
Chief Complaint   Patient presents with   • Pre-op Exam     PANNICULECTOMY on 2/28 @ [facility] with Dr. Chloé Royal      Pt. With recurrent yeast infection in pannus.  Plan is for elective panniculectomy next week.  Denied fevers/chills.  NO current chest pains.  Continues with anxiety.  Denied dyspnea, cough.  Had 100lb weight loss with previous bariatric surgery.    Past Medical History:   Diagnosis Date   • Anxiety    • Asthma     has albuterol uses infrequently every few months   • Chronic ankle pain    • Diabetes mellitus type II ([facility])    • Endometriosis    • High cholesterol    • Hyperlipidemia    • Hypertension    • Morbid obesity with BMI of 50.0-59.9, adult ([facility])    • Obstructive sleep apnea    • GILES (obstructive sleep apnea)    • Osteoarthritis    • Squamous cell carcinoma of skin    • Wears contact lenses    • Wears dentures     full upper   • Wears glasses      Past Surgical History:   Procedure Laterality Date   • ANKLE SURGERY      Left ankle surgeries x 13 secondary to trauma   • AVNRT ablation  12/2015   • CATARACT EXTRACTION Right     2024   • GASTRECTOMY SLEEVE LAPAROSCOPIC NON-COMPLICATED N/A 12/03/2021    Procedure: LAPAROSCOPIC SLEEVE GASTRECTOMY WITH TITAN CLAMP;  Surgeon: Greg Valera MD;  [facility];  Service: General;  Laterality: N/A;   • HERNIA REPAIR      as a child   • HYSTERECTOMY      TAHBSO age 34   • REDUCTION BREAST  2001   • SHOULDER SURGERY Left     2024     No previous anesthesia complications      Current Outpatient Medications:   •  albuterol sulfate (PROAIR RESPICLICK) 90 mcg/actuation AePB, Inhale 2 puffs into the lungs every 4 hours as needed. OK to substitute preferred albuterol formulation., Disp: 1 each, Rfl: 11  •  blood sugar diagnostic (GLUCOSE BLOOD) Strp, Use 1 strip as directed 3 times a day as needed. Indications: diabetes mellitus, Disp: 100 strip, Rfl: 11  •  blood sugar diagnostic, drum (ACCU-CHEK COMPACT TEST) Strp, Test up to three

## 2025-02-24 NOTE — PROGRESS NOTES
[facility] PRE-SURGICAL TESTING INSTRUCTIONS                      PRIOR TO PROCEDURE DATE:    1. PLEASE FOLLOW ANY INSTRUCTIONS GIVEN TO YOU PER YOUR SURGEON.      2. Arrange for someone to drive you home and be with you for the first 24 hours after discharge for your safety after your procedure for which you received sedation. Ensure it is someone we can share information with regarding your discharge.     NOTE: At this time ONLY 2 ADULTS may accompany you   One person ENCOURAGED to stay at hospital entire time if outpatient surgery      3. You must contact your surgeon for instructions IF:  You are taking any blood thinners, aspirin, anti-inflammatory or vitamins.  There is a change in your physical condition such as a cold, fever, rash, cuts, sores, or any other infection, especially near your surgical site.    4. Do not drink alcohol the day before or day of your procedure.  Do not use any recreational marijuana at least 24 hours or street drugs (heroin, cocaine) at minimum 5 days prior to your procedure.     5. A Pre-Surgical History and Physical MUST be completed WITHIN 30 DAYS OR LESS prior to your procedure.by your Physician or an Urgent Care        THE DAY OF YOUR PROCEDURE:  1.  Follow instructions for ARRIVAL TIME as DIRECTED BY YOUR SURGEON.     2. Enter the MAIN entrance from [facility] and follow the signs to the free Parking Garage or  Parking (offered free of charge 7 am-5pm).      3. Enter the Main Entrance of the hospital (do not enter from the lower level of the parking garage). Upon entrance, check in with the  at the surgical information desk on your LEFT.   Bring your insurance card and photo ID to register      4. DO NOT EAT ANYTHING 8 hours prior to arrival for surgery.  You may have up to 8 ounces of water 4 hours prior to your arrival for surgery.   NOTE: ALL Gastric, Bariatric & Bowel surgery patients - you MUST follow your surgeon's instructions regarding  you use a CPAP, please bring it with you on the day of your procedure.    10. If you use oxygen at home, please bring your oxygen tank with you to hospital..     11. We recommend that valuable personal belongings such as cash, cell phones, e-tablets, or jewelry, be left at home during your stay. The hospital will not be responsible for valuables that are not secured in the hospital safe. However, if your insurance requires a co-pay, you may want to bring a method of payment, i.e., Check or credit card, if you wish to pay your co-pay the day of surgery.      12. If you are to stay overnight, you may bring a bag with personal items. Please have any large items you may need brought in by your family after your arrival to your hospital room.    13. If you have a Living Will or Durable Power of , please bring a copy on the day of your procedure.     How we keep you safe and work to prevent surgical site infections:   1. Health care workers should always check your ID bracelet to verify your name and birth date. You will be asked many times to state your name, date of birth, and allergies.    2. Health care workers should always clean their hands with soap or alcohol gel before providing care to you. It is okay to ask anyone if they cleaned their hands before they touch you.    3. You will be actively involved in verifying the type of procedure you are having and ensuring the correct surgical site. This will be confirmed multiple times prior to your procedure. Do NOT alexis your surgery site UNLESS instructed to by your surgeon.     4. When you are in the operating room, your surgical site will be cleansed with a special soap, and in most cases, you will be given an antibiotic before the surgery begins.      What to expect AFTER your procedure?  1. Immediately following your procedure, your will be taken to the PACU for the first phase of your recovery.  Your nurse will help you recover from any potential side

## 2025-02-24 NOTE — PROGRESS NOTES
2/24/25 1458: Spoke with Mireya at pt's PCP office to request EKG tracing. PAT fax and phone numbers given.- BDP

## 2025-02-26 ENCOUNTER — OFFICE VISIT (OUTPATIENT)
Dept: SURGERY | Age: 65
End: 2025-02-26
Payer: MEDICARE

## 2025-02-26 DIAGNOSIS — M79.3 PANNICULITIS: Primary | ICD-10-CM

## 2025-02-26 PROCEDURE — 99214 OFFICE O/P EST MOD 30 MIN: CPT

## 2025-02-26 PROCEDURE — 1123F ACP DISCUSS/DSCN MKR DOCD: CPT

## 2025-02-26 NOTE — PROGRESS NOTES
[facility]     Consultation requested by: Self      CC: Body contouring     HPI: 64 y.o. female with a PMHx as delineated below who presents in consultation for body contouring. Since she has hanging skin of upper arms and hanging skin of abdomen and she has tried OTC treatments for the rashes without improvement.  She wishes to discuss options for improving function of hanging abdomen skin.     Since her last evaluation, she has lost an additional 3 pounds. She presents today for pre-op and post-op education.      Bariatric surgery: Yes  / date: 5/3/21           (Type: Unsure UC )     Max weight (lbs): 298  Lowest weight (lbs): 178  Current (lbs): 193 (196) (203, 208)  Weight change in the past 3 months: No  Max BMI: 52.8  Current BMI: 33.13 (34.9 )(36, 36.8).     History of DVT/PE: No  Excess skin cover genitals: Yes     Previous body contouring procedures: Yes         Location: [facility] in her 40s  Smoking: No                Pregnancy / Miscarriage: 2/0     Past Medical History        Past Medical History:   Diagnosis Date    Anxiety      Diabetes mellitus (HCC)      Hyperlipidemia      Hypertension      Obstructive sleep apnea      Obstructive sleep apnea (adult) (pediatric)      Paroxysmal SVT (supraventricular tachycardia) (HCC)        Hemoglobin A1C 5.1 5/10/24     Past Surgical History         Past Surgical History:   Procedure Laterality Date    ANKLE FUSION        BREAST SURGERY Bilateral       reduction    FRACTURE SURGERY Left       ankle    GASTRIC BYPASS SURGERY        HYSTERECTOMY (CERVIX STATUS UNKNOWN)        ROTATOR CUFF REPAIR           [facility]    Social History               Socioeconomic History    Marital status: Single       Spouse name: Not on file    Number of children: Not on file    Years of education: Not on file    Highest education level: Not on file   Occupational History    Not on file   Tobacco Use    Smoking status: Former       Current packs/day: 0.00

## 2025-02-27 NOTE — PROGRESS NOTES
2/27/2025 10:29am requested EKG tracing from PCP office Dr. Eddy [phone] spoke with Bushra to fax. -db

## 2025-02-28 ENCOUNTER — ANESTHESIA EVENT (OUTPATIENT)
Dept: OPERATING ROOM | Age: 65
DRG: 572 | End: 2025-02-28
Payer: MEDICARE

## 2025-02-28 ENCOUNTER — HOSPITAL ENCOUNTER (INPATIENT)
Age: 65
LOS: 1 days | Discharge: HOME | DRG: 572 | End: 2025-03-01
Attending: SURGERY | Admitting: SURGERY
Payer: MEDICARE

## 2025-02-28 ENCOUNTER — ANESTHESIA (OUTPATIENT)
Dept: OPERATING ROOM | Age: 65
DRG: 572 | End: 2025-02-28
Payer: MEDICARE

## 2025-02-28 DIAGNOSIS — M79.3 PANNICULITIS: ICD-10-CM

## 2025-02-28 LAB
GLUCOSE BLD-MCNC: 111 MG/DL (ref 70–99)
GLUCOSE BLD-MCNC: 160 MG/DL (ref 70–99)
GLUCOSE BLD-MCNC: 167 MG/DL (ref 70–99)
GLUCOSE BLD-MCNC: 278 MG/DL (ref 70–99)
PERFORMED ON: ABNORMAL

## 2025-02-28 PROCEDURE — 2500000003 HC RX 250 WO HCPCS: Performed by: NURSE ANESTHETIST, CERTIFIED REGISTERED

## 2025-02-28 PROCEDURE — 6360000002 HC RX W HCPCS: Performed by: SURGERY

## 2025-02-28 PROCEDURE — 3600000014 HC SURGERY LEVEL 4 ADDTL 15MIN: Performed by: SURGERY

## 2025-02-28 PROCEDURE — 3700000000 HC ANESTHESIA ATTENDED CARE: Performed by: SURGERY

## 2025-02-28 PROCEDURE — 15830 EXC EXCESSIVE SKIN ABDOMEN: CPT | Performed by: SURGERY

## 2025-02-28 PROCEDURE — 3700000001 HC ADD 15 MINUTES (ANESTHESIA): Performed by: SURGERY

## 2025-02-28 PROCEDURE — 2580000003 HC RX 258

## 2025-02-28 PROCEDURE — 6360000002 HC RX W HCPCS: Performed by: NURSE ANESTHETIST, CERTIFIED REGISTERED

## 2025-02-28 PROCEDURE — 1200000000 HC SEMI PRIVATE

## 2025-02-28 PROCEDURE — 2580000003 HC RX 258: Performed by: ANESTHESIOLOGY

## 2025-02-28 PROCEDURE — 7100000000 HC PACU RECOVERY - FIRST 15 MIN: Performed by: SURGERY

## 2025-02-28 PROCEDURE — 6370000000 HC RX 637 (ALT 250 FOR IP)

## 2025-02-28 PROCEDURE — 2500000003 HC RX 250 WO HCPCS: Performed by: SURGERY

## 2025-02-28 PROCEDURE — 2720000010 HC SURG SUPPLY STERILE: Performed by: SURGERY

## 2025-02-28 PROCEDURE — 2709999900 HC NON-CHARGEABLE SUPPLY: Performed by: SURGERY

## 2025-02-28 PROCEDURE — 6360000002 HC RX W HCPCS: Performed by: ANESTHESIOLOGY

## 2025-02-28 PROCEDURE — 6360000002 HC RX W HCPCS

## 2025-02-28 PROCEDURE — 2500000003 HC RX 250 WO HCPCS

## 2025-02-28 PROCEDURE — 7100000001 HC PACU RECOVERY - ADDTL 15 MIN: Performed by: SURGERY

## 2025-02-28 PROCEDURE — 88304 TISSUE EXAM BY PATHOLOGIST: CPT

## 2025-02-28 PROCEDURE — 6370000000 HC RX 637 (ALT 250 FOR IP): Performed by: ANESTHESIOLOGY

## 2025-02-28 PROCEDURE — 3600000004 HC SURGERY LEVEL 4 BASE: Performed by: SURGERY

## 2025-02-28 PROCEDURE — 0JB80ZZ EXCISION OF ABDOMEN SUBCUTANEOUS TISSUE AND FASCIA, OPEN APPROACH: ICD-10-PCS | Performed by: SURGERY

## 2025-02-28 RX ORDER — SOLIFENACIN SUCCINATE 10 MG/1
5 TABLET, FILM COATED ORAL DAILY
COMMUNITY

## 2025-02-28 RX ORDER — KETAMINE HCL IN NACL, ISO-OSM 20 MG/2 ML
SYRINGE (ML) INJECTION
Status: DISCONTINUED | OUTPATIENT
Start: 2025-02-28 | End: 2025-02-28 | Stop reason: SDUPTHER

## 2025-02-28 RX ORDER — SODIUM CHLORIDE, SODIUM LACTATE, POTASSIUM CHLORIDE, CALCIUM CHLORIDE 600; 310; 30; 20 MG/100ML; MG/100ML; MG/100ML; MG/100ML
INJECTION, SOLUTION INTRAVENOUS CONTINUOUS
Status: DISCONTINUED | OUTPATIENT
Start: 2025-02-28 | End: 2025-02-28 | Stop reason: HOSPADM

## 2025-02-28 RX ORDER — SODIUM CHLORIDE, SODIUM LACTATE, POTASSIUM CHLORIDE, CALCIUM CHLORIDE 600; 310; 30; 20 MG/100ML; MG/100ML; MG/100ML; MG/100ML
INJECTION, SOLUTION INTRAVENOUS CONTINUOUS
Status: DISCONTINUED | OUTPATIENT
Start: 2025-02-28 | End: 2025-03-01

## 2025-02-28 RX ORDER — ALBUTEROL SULFATE 90 UG/1
2 INHALANT RESPIRATORY (INHALATION)
Status: DISCONTINUED | OUTPATIENT
Start: 2025-02-28 | End: 2025-02-28 | Stop reason: CLARIF

## 2025-02-28 RX ORDER — ONDANSETRON 2 MG/ML
4 INJECTION INTRAMUSCULAR; INTRAVENOUS
Status: DISCONTINUED | OUTPATIENT
Start: 2025-02-28 | End: 2025-02-28 | Stop reason: HOSPADM

## 2025-02-28 RX ORDER — ROCURONIUM BROMIDE 10 MG/ML
INJECTION, SOLUTION INTRAVENOUS
Status: DISCONTINUED | OUTPATIENT
Start: 2025-02-28 | End: 2025-02-28 | Stop reason: SDUPTHER

## 2025-02-28 RX ORDER — OXYCODONE HYDROCHLORIDE 5 MG/1
5 TABLET ORAL EVERY 4 HOURS PRN
Status: DISCONTINUED | OUTPATIENT
Start: 2025-02-28 | End: 2025-03-01 | Stop reason: HOSPADM

## 2025-02-28 RX ORDER — HYDROMORPHONE HYDROCHLORIDE 1 MG/ML
0.5 INJECTION, SOLUTION INTRAMUSCULAR; INTRAVENOUS; SUBCUTANEOUS EVERY 5 MIN PRN
Status: DISCONTINUED | OUTPATIENT
Start: 2025-02-28 | End: 2025-02-28 | Stop reason: HOSPADM

## 2025-02-28 RX ORDER — SODIUM CHLORIDE 0.9 % (FLUSH) 0.9 %
5-40 SYRINGE (ML) INJECTION PRN
Status: DISCONTINUED | OUTPATIENT
Start: 2025-02-28 | End: 2025-02-28 | Stop reason: HOSPADM

## 2025-02-28 RX ORDER — ATORVASTATIN CALCIUM 20 MG/1
20 TABLET, FILM COATED ORAL NIGHTLY
Status: DISCONTINUED | OUTPATIENT
Start: 2025-02-28 | End: 2025-03-01 | Stop reason: HOSPADM

## 2025-02-28 RX ORDER — ACETAMINOPHEN 325 MG/1
650 TABLET ORAL EVERY 6 HOURS
Status: DISCONTINUED | OUTPATIENT
Start: 2025-02-28 | End: 2025-03-01 | Stop reason: HOSPADM

## 2025-02-28 RX ORDER — APREPITANT 40 MG/1
40 CAPSULE ORAL ONCE
Status: COMPLETED | OUTPATIENT
Start: 2025-02-28 | End: 2025-02-28

## 2025-02-28 RX ORDER — DIPHENHYDRAMINE HYDROCHLORIDE 50 MG/ML
12.5 INJECTION INTRAMUSCULAR; INTRAVENOUS
Status: DISCONTINUED | OUTPATIENT
Start: 2025-02-28 | End: 2025-02-28 | Stop reason: HOSPADM

## 2025-02-28 RX ORDER — DEXTROSE MONOHYDRATE 100 MG/ML
INJECTION, SOLUTION INTRAVENOUS CONTINUOUS PRN
Status: DISCONTINUED | OUTPATIENT
Start: 2025-02-28 | End: 2025-03-01 | Stop reason: HOSPADM

## 2025-02-28 RX ORDER — SODIUM CHLORIDE 9 MG/ML
INJECTION, SOLUTION INTRAVENOUS PRN
Status: DISCONTINUED | OUTPATIENT
Start: 2025-02-28 | End: 2025-03-01 | Stop reason: HOSPADM

## 2025-02-28 RX ORDER — SODIUM CHLORIDE 0.9 % (FLUSH) 0.9 %
5-40 SYRINGE (ML) INJECTION EVERY 12 HOURS SCHEDULED
Status: DISCONTINUED | OUTPATIENT
Start: 2025-02-28 | End: 2025-02-28 | Stop reason: HOSPADM

## 2025-02-28 RX ORDER — NALOXONE HYDROCHLORIDE 0.4 MG/ML
INJECTION, SOLUTION INTRAMUSCULAR; INTRAVENOUS; SUBCUTANEOUS PRN
Status: DISCONTINUED | OUTPATIENT
Start: 2025-02-28 | End: 2025-02-28 | Stop reason: HOSPADM

## 2025-02-28 RX ORDER — SCOPOLAMINE 1 MG/3D
1 PATCH, EXTENDED RELEASE TRANSDERMAL
Status: DISCONTINUED | OUTPATIENT
Start: 2025-02-28 | End: 2025-03-01 | Stop reason: HOSPADM

## 2025-02-28 RX ORDER — GINSENG 100 MG
CAPSULE ORAL DAILY
Status: DISCONTINUED | OUTPATIENT
Start: 2025-03-01 | End: 2025-03-01 | Stop reason: HOSPADM

## 2025-02-28 RX ORDER — FENTANYL CITRATE 50 UG/ML
25 INJECTION, SOLUTION INTRAMUSCULAR; INTRAVENOUS EVERY 5 MIN PRN
Status: DISCONTINUED | OUTPATIENT
Start: 2025-02-28 | End: 2025-02-28 | Stop reason: HOSPADM

## 2025-02-28 RX ORDER — PROCHLORPERAZINE EDISYLATE 5 MG/ML
5 INJECTION INTRAMUSCULAR; INTRAVENOUS
Status: DISCONTINUED | OUTPATIENT
Start: 2025-02-28 | End: 2025-02-28 | Stop reason: HOSPADM

## 2025-02-28 RX ORDER — DIPHENHYDRAMINE HYDROCHLORIDE 50 MG/ML
INJECTION INTRAMUSCULAR; INTRAVENOUS
Status: DISCONTINUED | OUTPATIENT
Start: 2025-02-28 | End: 2025-02-28 | Stop reason: SDUPTHER

## 2025-02-28 RX ORDER — LIDOCAINE HYDROCHLORIDE 10 MG/ML
1 INJECTION, SOLUTION EPIDURAL; INFILTRATION; INTRACAUDAL; PERINEURAL
Status: DISCONTINUED | OUTPATIENT
Start: 2025-02-28 | End: 2025-02-28 | Stop reason: HOSPADM

## 2025-02-28 RX ORDER — PROPOFOL 10 MG/ML
INJECTION, EMULSION INTRAVENOUS
Status: DISCONTINUED | OUTPATIENT
Start: 2025-02-28 | End: 2025-02-28 | Stop reason: SDUPTHER

## 2025-02-28 RX ORDER — HEPARIN SODIUM 5000 [USP'U]/ML
5000 INJECTION, SOLUTION INTRAVENOUS; SUBCUTANEOUS 2 TIMES DAILY
Status: DISCONTINUED | OUTPATIENT
Start: 2025-03-01 | End: 2025-03-01 | Stop reason: HOSPADM

## 2025-02-28 RX ORDER — LIDOCAINE HYDROCHLORIDE 20 MG/ML
INJECTION, SOLUTION INTRAVENOUS
Status: DISCONTINUED | OUTPATIENT
Start: 2025-02-28 | End: 2025-02-28 | Stop reason: SDUPTHER

## 2025-02-28 RX ORDER — METHOCARBAMOL 100 MG/ML
INJECTION, SOLUTION INTRAMUSCULAR; INTRAVENOUS
Status: DISCONTINUED | OUTPATIENT
Start: 2025-02-28 | End: 2025-02-28 | Stop reason: SDUPTHER

## 2025-02-28 RX ORDER — MORPHINE SULFATE 15 MG/1
15 TABLET, FILM COATED, EXTENDED RELEASE ORAL EVERY 12 HOURS SCHEDULED
Status: DISCONTINUED | OUTPATIENT
Start: 2025-02-28 | End: 2025-03-01 | Stop reason: HOSPADM

## 2025-02-28 RX ORDER — ALBUTEROL SULFATE 0.83 MG/ML
2.5 SOLUTION RESPIRATORY (INHALATION)
Status: DISCONTINUED | OUTPATIENT
Start: 2025-02-28 | End: 2025-02-28

## 2025-02-28 RX ORDER — ALBUTEROL SULFATE 0.83 MG/ML
2.5 SOLUTION RESPIRATORY (INHALATION) EVERY 4 HOURS PRN
Status: DISCONTINUED | OUTPATIENT
Start: 2025-02-28 | End: 2025-03-01 | Stop reason: HOSPADM

## 2025-02-28 RX ORDER — FAMOTIDINE 10 MG/ML
INJECTION, SOLUTION INTRAVENOUS
Status: DISCONTINUED | OUTPATIENT
Start: 2025-02-28 | End: 2025-02-28 | Stop reason: SDUPTHER

## 2025-02-28 RX ORDER — PHENYLEPHRINE HYDROCHLORIDE 10 MG/ML
INJECTION INTRAVENOUS
Status: DISCONTINUED | OUTPATIENT
Start: 2025-02-28 | End: 2025-02-28 | Stop reason: SDUPTHER

## 2025-02-28 RX ORDER — HEPARIN SODIUM 5000 [USP'U]/ML
5000 INJECTION, SOLUTION INTRAVENOUS; SUBCUTANEOUS
Status: COMPLETED | OUTPATIENT
Start: 2025-02-28 | End: 2025-02-28

## 2025-02-28 RX ORDER — IPRATROPIUM BROMIDE AND ALBUTEROL SULFATE 2.5; .5 MG/3ML; MG/3ML
1 SOLUTION RESPIRATORY (INHALATION)
Status: DISCONTINUED | OUTPATIENT
Start: 2025-02-28 | End: 2025-02-28 | Stop reason: HOSPADM

## 2025-02-28 RX ORDER — LABETALOL HYDROCHLORIDE 5 MG/ML
10 INJECTION, SOLUTION INTRAVENOUS
Status: DISCONTINUED | OUTPATIENT
Start: 2025-02-28 | End: 2025-02-28 | Stop reason: HOSPADM

## 2025-02-28 RX ORDER — LORAZEPAM 2 MG/ML
0.5 INJECTION INTRAMUSCULAR
Status: DISCONTINUED | OUTPATIENT
Start: 2025-02-28 | End: 2025-02-28 | Stop reason: HOSPADM

## 2025-02-28 RX ORDER — MAGNESIUM HYDROXIDE 1200 MG/15ML
LIQUID ORAL CONTINUOUS PRN
Status: DISCONTINUED | OUTPATIENT
Start: 2025-02-28 | End: 2025-02-28 | Stop reason: HOSPADM

## 2025-02-28 RX ORDER — SODIUM CHLORIDE 0.9 % (FLUSH) 0.9 %
10 SYRINGE (ML) INJECTION EVERY 12 HOURS SCHEDULED
Status: DISCONTINUED | OUTPATIENT
Start: 2025-02-28 | End: 2025-03-01 | Stop reason: HOSPADM

## 2025-02-28 RX ORDER — ESCITALOPRAM OXALATE 20 MG/1
20 TABLET ORAL DAILY
Status: DISCONTINUED | OUTPATIENT
Start: 2025-03-01 | End: 2025-03-01 | Stop reason: HOSPADM

## 2025-02-28 RX ORDER — SODIUM CHLORIDE 9 MG/ML
INJECTION, SOLUTION INTRAVENOUS PRN
Status: DISCONTINUED | OUTPATIENT
Start: 2025-02-28 | End: 2025-02-28 | Stop reason: HOSPADM

## 2025-02-28 RX ORDER — HYDROMORPHONE HYDROCHLORIDE 2 MG/ML
INJECTION, SOLUTION INTRAMUSCULAR; INTRAVENOUS; SUBCUTANEOUS
Status: DISCONTINUED | OUTPATIENT
Start: 2025-02-28 | End: 2025-02-28 | Stop reason: SDUPTHER

## 2025-02-28 RX ORDER — HYDROMORPHONE HYDROCHLORIDE 1 MG/ML
0.5 INJECTION, SOLUTION INTRAMUSCULAR; INTRAVENOUS; SUBCUTANEOUS
Status: DISCONTINUED | OUTPATIENT
Start: 2025-02-28 | End: 2025-03-01

## 2025-02-28 RX ORDER — SODIUM CHLORIDE 0.9 % (FLUSH) 0.9 %
10 SYRINGE (ML) INJECTION PRN
Status: DISCONTINUED | OUTPATIENT
Start: 2025-02-28 | End: 2025-03-01 | Stop reason: HOSPADM

## 2025-02-28 RX ORDER — INSULIN LISPRO 100 [IU]/ML
0-8 INJECTION, SOLUTION INTRAVENOUS; SUBCUTANEOUS
Status: DISCONTINUED | OUTPATIENT
Start: 2025-02-28 | End: 2025-03-01 | Stop reason: HOSPADM

## 2025-02-28 RX ORDER — PANTOPRAZOLE SODIUM 40 MG/1
40 TABLET, DELAYED RELEASE ORAL DAILY
Status: DISCONTINUED | OUTPATIENT
Start: 2025-03-01 | End: 2025-03-01 | Stop reason: HOSPADM

## 2025-02-28 RX ORDER — ONDANSETRON 2 MG/ML
4 INJECTION INTRAMUSCULAR; INTRAVENOUS EVERY 6 HOURS PRN
Status: DISCONTINUED | OUTPATIENT
Start: 2025-02-28 | End: 2025-03-01 | Stop reason: HOSPADM

## 2025-02-28 RX ORDER — ONDANSETRON 2 MG/ML
INJECTION INTRAMUSCULAR; INTRAVENOUS
Status: DISCONTINUED | OUTPATIENT
Start: 2025-02-28 | End: 2025-02-28 | Stop reason: SDUPTHER

## 2025-02-28 RX ORDER — HYDROXYZINE HYDROCHLORIDE 25 MG/1
25 TABLET, FILM COATED ORAL EVERY 6 HOURS PRN
Status: DISCONTINUED | OUTPATIENT
Start: 2025-02-28 | End: 2025-03-01 | Stop reason: HOSPADM

## 2025-02-28 RX ORDER — BUSPIRONE HYDROCHLORIDE 10 MG/1
10 TABLET ORAL 2 TIMES DAILY
Status: DISCONTINUED | OUTPATIENT
Start: 2025-03-01 | End: 2025-02-28

## 2025-02-28 RX ORDER — OXYCODONE HYDROCHLORIDE 5 MG/1
10 TABLET ORAL EVERY 4 HOURS PRN
Status: DISCONTINUED | OUTPATIENT
Start: 2025-02-28 | End: 2025-03-01 | Stop reason: HOSPADM

## 2025-02-28 RX ORDER — GLUCAGON 1 MG/ML
1 KIT INJECTION PRN
Status: DISCONTINUED | OUTPATIENT
Start: 2025-02-28 | End: 2025-03-01 | Stop reason: HOSPADM

## 2025-02-28 RX ORDER — MEPERIDINE HYDROCHLORIDE 25 MG/ML
12.5 INJECTION INTRAMUSCULAR; INTRAVENOUS; SUBCUTANEOUS EVERY 5 MIN PRN
Status: DISCONTINUED | OUTPATIENT
Start: 2025-02-28 | End: 2025-02-28 | Stop reason: HOSPADM

## 2025-02-28 RX ORDER — HYDROMORPHONE HYDROCHLORIDE 1 MG/ML
0.25 INJECTION, SOLUTION INTRAMUSCULAR; INTRAVENOUS; SUBCUTANEOUS
Status: DISCONTINUED | OUTPATIENT
Start: 2025-02-28 | End: 2025-03-01

## 2025-02-28 RX ORDER — HYDRALAZINE HYDROCHLORIDE 20 MG/ML
INJECTION INTRAMUSCULAR; INTRAVENOUS
Status: DISCONTINUED | OUTPATIENT
Start: 2025-02-28 | End: 2025-02-28 | Stop reason: SDUPTHER

## 2025-02-28 RX ORDER — BUSPIRONE HYDROCHLORIDE 10 MG/1
30 TABLET ORAL 2 TIMES DAILY
Status: DISCONTINUED | OUTPATIENT
Start: 2025-02-28 | End: 2025-03-01 | Stop reason: HOSPADM

## 2025-02-28 RX ORDER — ONDANSETRON 4 MG/1
4 TABLET, ORALLY DISINTEGRATING ORAL EVERY 8 HOURS PRN
Status: DISCONTINUED | OUTPATIENT
Start: 2025-02-28 | End: 2025-03-01 | Stop reason: HOSPADM

## 2025-02-28 RX ADMIN — METHOCARBAMOL 1000 MG: 100 INJECTION, SOLUTION INTRAMUSCULAR; INTRAVENOUS at 09:38

## 2025-02-28 RX ADMIN — PHENYLEPHRINE HYDROCHLORIDE 50 MCG: 10 INJECTION, SOLUTION INTRAVENOUS at 09:40

## 2025-02-28 RX ADMIN — ACETAMINOPHEN 650 MG: 325 TABLET ORAL at 17:44

## 2025-02-28 RX ADMIN — ROCURONIUM BROMIDE 100 MG: 10 INJECTION, SOLUTION INTRAVENOUS at 09:32

## 2025-02-28 RX ADMIN — PROPOFOL 100 MG: 10 INJECTION, EMULSION INTRAVENOUS at 10:46

## 2025-02-28 RX ADMIN — WATER 2000 MG: 1 INJECTION INTRAMUSCULAR; INTRAVENOUS; SUBCUTANEOUS at 17:44

## 2025-02-28 RX ADMIN — ATORVASTATIN CALCIUM 20 MG: 20 TABLET, FILM COATED ORAL at 20:34

## 2025-02-28 RX ADMIN — BUSPIRONE HYDROCHLORIDE 30 MG: 10 TABLET ORAL at 20:34

## 2025-02-28 RX ADMIN — DIPHENHYDRAMINE HYDROCHLORIDE 10 MG: 50 INJECTION, SOLUTION INTRAMUSCULAR; INTRAVENOUS at 09:29

## 2025-02-28 RX ADMIN — HYDRALAZINE HYDROCHLORIDE 10 MG: 20 INJECTION INTRAMUSCULAR; INTRAVENOUS at 10:09

## 2025-02-28 RX ADMIN — HYDROMORPHONE HYDROCHLORIDE 0.5 MG: 1 INJECTION, SOLUTION INTRAMUSCULAR; INTRAVENOUS; SUBCUTANEOUS at 11:44

## 2025-02-28 RX ADMIN — PROPOFOL 150 MG: 10 INJECTION, EMULSION INTRAVENOUS at 09:31

## 2025-02-28 RX ADMIN — MORPHINE SULFATE 15 MG: 15 TABLET, FILM COATED, EXTENDED RELEASE ORAL at 20:34

## 2025-02-28 RX ADMIN — MEPERIDINE HYDROCHLORIDE 12.5 MG: 25 INJECTION INTRAMUSCULAR; INTRAVENOUS; SUBCUTANEOUS at 11:14

## 2025-02-28 RX ADMIN — SODIUM CHLORIDE, SODIUM LACTATE, POTASSIUM CHLORIDE, AND CALCIUM CHLORIDE: .6; .31; .03; .02 INJECTION, SOLUTION INTRAVENOUS at 12:33

## 2025-02-28 RX ADMIN — HYDROMORPHONE HYDROCHLORIDE 0.5 MG: 1 INJECTION, SOLUTION INTRAMUSCULAR; INTRAVENOUS; SUBCUTANEOUS at 11:33

## 2025-02-28 RX ADMIN — HEPARIN SODIUM 5000 UNITS: 5000 INJECTION INTRAVENOUS; SUBCUTANEOUS at 08:47

## 2025-02-28 RX ADMIN — ACETAMINOPHEN 650 MG: 325 TABLET ORAL at 12:56

## 2025-02-28 RX ADMIN — HYDROMORPHONE HYDROCHLORIDE 2 MG: 2 INJECTION, SOLUTION INTRAMUSCULAR; INTRAVENOUS; SUBCUTANEOUS at 09:31

## 2025-02-28 RX ADMIN — PROPOFOL 100 MG: 10 INJECTION, EMULSION INTRAVENOUS at 10:09

## 2025-02-28 RX ADMIN — SUGAMMADEX 200 MG: 100 INJECTION, SOLUTION INTRAVENOUS at 10:53

## 2025-02-28 RX ADMIN — ONDANSETRON 8 MG: 2 INJECTION INTRAMUSCULAR; INTRAVENOUS at 09:29

## 2025-02-28 RX ADMIN — INSULIN LISPRO 4 UNITS: 100 INJECTION, SOLUTION INTRAVENOUS; SUBCUTANEOUS at 20:41

## 2025-02-28 RX ADMIN — FAMOTIDINE 20 MG: 10 INJECTION, SOLUTION INTRAVENOUS at 09:29

## 2025-02-28 RX ADMIN — APREPITANT 40 MG: 40 CAPSULE ORAL at 08:48

## 2025-02-28 RX ADMIN — SODIUM CHLORIDE, SODIUM LACTATE, POTASSIUM CHLORIDE, AND CALCIUM CHLORIDE: .6; .31; .03; .02 INJECTION, SOLUTION INTRAVENOUS at 19:34

## 2025-02-28 RX ADMIN — MEPERIDINE HYDROCHLORIDE 12.5 MG: 25 INJECTION INTRAMUSCULAR; INTRAVENOUS; SUBCUTANEOUS at 11:08

## 2025-02-28 RX ADMIN — SODIUM CHLORIDE, SODIUM LACTATE, POTASSIUM CHLORIDE, AND CALCIUM CHLORIDE: .6; .31; .03; .02 INJECTION, SOLUTION INTRAVENOUS at 08:46

## 2025-02-28 RX ADMIN — SODIUM CHLORIDE, SODIUM LACTATE, POTASSIUM CHLORIDE, AND CALCIUM CHLORIDE: .6; .31; .03; .02 INJECTION, SOLUTION INTRAVENOUS at 10:23

## 2025-02-28 RX ADMIN — Medication 20 MG: at 09:31

## 2025-02-28 RX ADMIN — LIDOCAINE HYDROCHLORIDE 100 MG: 20 INJECTION, SOLUTION INTRAVENOUS at 09:31

## 2025-02-28 ASSESSMENT — PAIN SCALES - GENERAL
PAINLEVEL_OUTOF10: 3
PAINLEVEL_OUTOF10: 0
PAINLEVEL_OUTOF10: 7
PAINLEVEL_OUTOF10: 0

## 2025-02-28 ASSESSMENT — PAIN DESCRIPTION - DESCRIPTORS
DESCRIPTORS: OTHER (COMMENT)
DESCRIPTORS: ACHING
DESCRIPTORS: OTHER (COMMENT)
DESCRIPTORS: ACHING

## 2025-02-28 ASSESSMENT — PAIN - FUNCTIONAL ASSESSMENT: PAIN_FUNCTIONAL_ASSESSMENT: PREVENTS OR INTERFERES SOME ACTIVE ACTIVITIES AND ADLS

## 2025-02-28 ASSESSMENT — PAIN DESCRIPTION - LOCATION
LOCATION: OTHER (COMMENT)
LOCATION: ABDOMEN
LOCATION: ABDOMEN
LOCATION: OTHER (COMMENT)

## 2025-02-28 ASSESSMENT — PAIN DESCRIPTION - ORIENTATION
ORIENTATION: OTHER (COMMENT)
ORIENTATION: MID;LOWER
ORIENTATION: OTHER (COMMENT)
ORIENTATION: MID

## 2025-02-28 ASSESSMENT — PAIN DESCRIPTION - ONSET: ONSET: ON-GOING

## 2025-02-28 ASSESSMENT — PAIN DESCRIPTION - FREQUENCY: FREQUENCY: CONTINUOUS

## 2025-02-28 ASSESSMENT — PAIN DESCRIPTION - PAIN TYPE: TYPE: SURGICAL PAIN

## 2025-02-28 NOTE — ANESTHESIA PRE PROCEDURE
Department of Anesthesiology  Preprocedure Note       Name:  Katie Ramires   Age:  64 y.o.  :  1960                                          MRN:  [id number]         Date:  2025      Surgeon: Surgeon(s):  Chloé Royal MD    Procedure: Procedure(s):  PANNICULECTOMY    Medications prior to admission:   Prior to Admission medications    Medication Sig Start Date End Date Taking? Authorizing Provider   hydrOXYzine HCl (ATARAX) 25 MG tablet Take 1 tablet by mouth every 6 hours as needed 24   Paloma Mathis MD B-D ULTRAFINE III SHORT PEN 31G X 8 MM MISC  24   Paloma Mathis MD   nystatin (MYCOSTATIN) 553927 UNIT/GM cream Apply topically 2 times daily 24   Paloma Mathis MD   busPIRone (BUSPAR) 30 MG tablet  24   Paloma Mathis MD   TRULICITY 0.75 MG/0.5ML SOPN SC injection  24   Paloma Mathis MD   losartan (COZAAR) 50 MG tablet  24   Paloma Mathis MD   morphine (MS CONTIN) 15 MG extended release tablet  10/10/24   Paloma Mathis MD   pantoprazole (PROTONIX) 40 MG tablet Take 1 tablet by mouth daily 22   Paloma Mathis MD   busPIRone (BUSPAR) 10 MG tablet Take 1 tablet by mouth 2 times daily 21   Paloma Mathis MD   Multiple Vitamin (MULTI-VITAMIN DAILY PO) Take by mouth    Paloma Mathis MD   albuterol sulfate HFA (PROVENTIL;VENTOLIN;PROAIR) 108 (90 Base) MCG/ACT inhaler Inhale 2 puffs into the lungs 18   Paloma Mathis MD   escitalopram (LEXAPRO) 20 MG tablet Take 1 tablet by mouth 18   Paloma Mathis MD   simvastatin (ZOCOR) 20 MG tablet TAKE ONE TABLET BY MOUTH AT BEDTIME 18   Paloma Mathis MD   vitamin B-12 (CYANOCOBALAMIN) 1000 MCG tablet Take 2.5 tablets by mouth    Paloma Mathis MD   losartan (COZAAR) 25 MG tablet Take 0.5 tablets by mouth daily 10/9/15   Jennifer Hernandez MD   Cholecalciferol (VITAMIN D3) 2000 UNITS CAPS Take 1 tablet by mouth 2

## 2025-02-28 NOTE — CONSULTS
Clinical Pharmacy Progress Note  Medication History     Pharmacy consulted to verify home medication list by Dr. Thomas.    List of of current medications patient is taking is complete. Home Medication list in EPIC updated to reflect changes noted below.    Source of information: patient interview at bedside, pharmacy fills via [facility]    Patient's home pharmacy: [address] ([phone])     Changes made to medication list:   Medications removed:   Duplicates of Losartan and Buspirone    Medications added:   Solifenacin 10mg po daily    Medication doses adjusted / updated:   Buspirone - takes 30mg po BID  Losartan - takes 25mg (1/2 of 50mg tab) po daily  Morphine ER - takes 15mg po BID  Pantoprazole - takes 40mg po BID  Trulicity - takes 0.75mg SQ weekly    Other notes:   Morphine ER - appears to be prescribed as 15mg po TID, but patient states she takes BID    Please call with questions--  Thanks--  Mary Anne Romero, PharmD, BCPS, BCGP  [phone] ([facility] [place])   2/28/2025 12:52 PM      Current Outpatient Medications   Medication Instructions    albuterol sulfate HFA (PROVENTIL;VENTOLIN;PROAIR) 108 (90 Base) MCG/ACT inhaler 2 puffs, Inhalation    B-D ULTRAFINE III SHORT PEN 31G X 8 MM MISC     busPIRone (BUSPAR) 30 MG tablet Take 30 mg by mouth in the morning and at bedtime    Cholecalciferol (VITAMIN D3) 2000 UNITS CAPS 1 tablet, Oral, 2 TIMES DAILY    escitalopram (LEXAPRO) 20 mg, Oral, DAILY    hydrOXYzine HCl (ATARAX) 25 mg, Oral, EVERY 6 HOURS PRN    losartan (COZAAR) 50 MG tablet Take 0.5 tablets by mouth daily    morphine (MS CONTIN) 15 MG extended release tablet Take 1 tablet by mouth 2 times daily.    Multiple Vitamin (MULTI-VITAMIN DAILY PO) Oral    nystatin (MYCOSTATIN) 200071 UNIT/GM cream Topical, 2 TIMES DAILY    pantoprazole (PROTONIX) 40 mg, Oral, 2 times daily    simvastatin (ZOCOR) 20 MG tablet TAKE ONE TABLET BY MOUTH AT BEDTIME    solifenacin (VESICARE) 5 mg, Oral, DAILY     TRULICITY 0.75 MG/0.5ML SOPN SC injection Inject 0.5 mLs into the skin once a week    vitamin B-12 (CYANOCOBALAMIN) 2,500 mcg, Oral, DAILY

## 2025-02-28 NOTE — OP NOTE
subcutaneous fat in a wedge manner. This was then sent to pathology as specimen.  Hemostasis was again ensured using a combination of clips, electrocautery, ties, and Surgicel powder, and it was exemplary.  Two 15F drains were brought out through separate stab incisions and secured in place using 3-0 Nylon sutures.  The patient was then sat up and tailor tacked with rosendo.  Jarrett's fascia was then closed using 2-0 Vicryl suture followed by 3-0 Monocryl sutures for the deep dermis.  A hemal-umbilicus was created using an oval incision and the umbilicus was sutured to the skin using 3-0 Monocryl and 5-0 Fast Gut suture. A sterile dressing was applied and the patient was awakened, extubated, and taken to the PACU in satisfactory condition. There were no immediate complications and the patient tolerated the procedure well.  At the end of the case, all counts were correct.     The patient was then awakened and taken to the PACU in stable condition. There were no immediate complications and the patient tolerated the procedure well.     Chloé Royal MD

## 2025-02-28 NOTE — INTERVAL H&P NOTE
Update History & Physical    The patient's History and Physical of February 19,  was reviewed with the patient and I examined the patient. There was no change. The surgical site was confirmed by the patient and me.     Plan: The risks, benefits, expected outcome, and alternative to the recommended procedure have been discussed with the patient. Patient understands and wants to proceed with the procedure.     Electronically signed by Gerald Thomas DO on 2/28/2025 at 8:26 AM

## 2025-02-28 NOTE — PROGRESS NOTES
PACU Transfer Note    Vitals:    02/28/25 1200   BP: 136/72   Pulse: 82   Resp: 14   Temp: 97.9 °F (36.6 °C)   SpO2: 95%       In: 1644 [P.O.:60; I.V.:1584]  Out: 235 [Urine:175; Drains:10]    Pain assessment:  none  Pain Level: 3    Report given to Receiving unit RN with personal belongings.     2/28/2025 12:05 PM

## 2025-02-28 NOTE — BRIEF OP NOTE
Brief Postoperative Note      Patient: Katie Ramires  YOB: 1960  MRN: [id number]    Date of Procedure: 2/28/2025    Pre-Op Diagnosis Codes:      * Panniculitis [M79.3]    Post-Op Diagnosis: Same       Procedure(s):  PANNICULECTOMY    Surgeon(s):  Chloé Royal MD    Assistant:  Surgical Assistant: Belkis Rodas  Resident: Gerald Thomas DO    Anesthesia: General    Estimated Blood Loss (mL): Minimal    Complications: None    Specimens:   ID Type Source Tests Collected by Time Destination   A : PANNUS Tissue Tissue SURGICAL PATHOLOGY Chloé Royal MD 2/28/2025 0956        Implants:  * No implants in log *      Drains:   Closed/Suction Drain Right Abdomen;RLQ Bulb (Active)       Closed/Suction Drain LLQ Bulb (Active)       Urinary Catheter 02/28/25 Robb (Active)       Findings:  Infection Present At Time Of Surgery (PATOS) (choose all levels that have infection present):  No infection present  Other Findings: Panniculectomy with bilateral DAGOBERTO drain placement. Benoit-umbilicus.     Electronically signed by Gerald Thomas DO on 2/28/2025 at 10:56 AM

## 2025-02-28 NOTE — PROGRESS NOTES
Patient admitted to PACU # 07 from OR at 1101 post PANNICULECTOMY per Dr. Royal.  Attached to PACU monitoring system and report received from anesthesia provider.  Pt arrived to pacu with visible shivering. PRN demerol given. Pt has abd binder and foam in place. L and R DAGOBERTO drains present and is compressed. Pt on 5 L NC. Pt NSR on monitor. Will continue to monitor.

## 2025-02-28 NOTE — PROGRESS NOTES
4 Eyes Skin Assessment     NAME:  Katie Ramires  YOB: 1960  MEDICAL RECORD NUMBER:  [id number]    The patient is being assessed for  Post-Op Surgical    I agree that at least one RN has performed a thorough Head to Toe Skin Assessment on the patient. ALL assessment sites listed below have been assessed.      Areas assessed by both nurses:    Head, Face, Ears, Shoulders, Back, Chest, Arms, Elbows, Hands, Sacrum. Buttock, Coccyx, Ischium, Legs. Feet and Heels, and Under Medical Devices         Does the Patient have a Wound? No noted wound(s)       Victor Hugo Prevention initiated by RN: No  Wound Care Orders initiated by RN: No    Pressure Injury (Stage 3,4, Unstageable, DTI, NWPT, and Complex wounds) if present, place Wound referral order by RN under : No    New Ostomies, if present place, Ostomy referral order under : No     Nurse 1 eSignature: Electronically signed by Amber Reyna RN on 2/28/25 at 12:44 PM EST    **SHARE this note so that the co-signing nurse can place an eSignature**    Nurse 2 eSignature: {Esignature:[id number]}

## 2025-02-28 NOTE — PROGRESS NOTES
Patient admitted to room 5306 from PACU. Patient is A&O x 4. VSS. Patient oriented to the room all safety measures in place. Patient given IS and SCDs at this time. Admission orders released and patient 4 eyes completed. Admission documentation completed. No other needs are noted at this time.    [x] Bed alarm on and cord plugged into wall  [x] Bed in lowest position  [x] Call light and bedside table within reach  [x] Patient educated on all safety measures  [x]Oxygen connected to wall (if applicable)     Nurse 1 Esignature: Electronically signed by Amber Reyna RN on 2/28/25 at 12:44 PM EST  Nurse 2 Esignature: {Esignature:[id number]}

## 2025-02-28 NOTE — ANESTHESIA POSTPROCEDURE EVALUATION
Department of Anesthesiology  Postprocedure Note    Patient: Katie Ramires  MRN: [id number]  YOB: 1960  Date of evaluation: 2/28/2025    Procedure Summary       Date: 02/28/25 Room / Location: [address]    Anesthesia Start: 0927 Anesthesia Stop: 1105    Procedure: PANNICULECTOMY (Abdomen) Diagnosis:       Panniculitis      (Panniculitis [M79.3])    Surgeons: Chloé Royal MD Responsible Provider: Arnold Barrett MD    Anesthesia Type: general ASA Status: 3            Anesthesia Type: No value filed.    Paulina Phase I: Paulina Score: 10    Paulina Phase II:      Anesthesia Post Evaluation    Patient location during evaluation: PACU  Patient participation: complete - patient participated  Level of consciousness: awake  Airway patency: patent  Nausea & Vomiting: no nausea and no vomiting  Cardiovascular status: blood pressure returned to baseline and hemodynamically stable  Respiratory status: acceptable  Hydration status: euvolemic  Multimodal analgesia pain management approach  Pain management: adequate    No notable events documented.

## 2025-03-01 VITALS
BODY MASS INDEX: 34.55 KG/M2 | HEIGHT: 63 IN | WEIGHT: 195 LBS | SYSTOLIC BLOOD PRESSURE: 133 MMHG | HEART RATE: 59 BPM | OXYGEN SATURATION: 95 % | DIASTOLIC BLOOD PRESSURE: 84 MMHG | RESPIRATION RATE: 18 BRPM | TEMPERATURE: 99 F

## 2025-03-01 LAB
ALBUMIN SERPL-MCNC: 3.4 G/DL (ref 3.4–5)
ANION GAP SERPL CALCULATED.3IONS-SCNC: 6 MMOL/L (ref 3–16)
BASOPHILS # BLD: 0.1 K/UL (ref 0–0.2)
BASOPHILS NFR BLD: 0.8 %
BUN SERPL-MCNC: 14 MG/DL (ref 7–20)
CALCIUM SERPL-MCNC: 9.1 MG/DL (ref 8.3–10.6)
CHLORIDE SERPL-SCNC: 105 MMOL/L (ref 99–110)
CO2 SERPL-SCNC: 26 MMOL/L (ref 21–32)
CREAT SERPL-MCNC: 0.9 MG/DL (ref 0.6–1.2)
DEPRECATED RDW RBC AUTO: 12.6 % (ref 12.4–15.4)
EOSINOPHIL # BLD: 0 K/UL (ref 0–0.6)
EOSINOPHIL NFR BLD: 0.1 %
GFR SERPLBLD CREATININE-BSD FMLA CKD-EPI: 71 ML/MIN/{1.73_M2}
GLUCOSE BLD-MCNC: 139 MG/DL (ref 70–99)
GLUCOSE BLD-MCNC: 156 MG/DL (ref 70–99)
GLUCOSE SERPL-MCNC: 141 MG/DL (ref 70–99)
HCT VFR BLD AUTO: 35.5 % (ref 36–48)
HGB BLD-MCNC: 12.1 G/DL (ref 12–16)
LYMPHOCYTES # BLD: 1.2 K/UL (ref 1–5.1)
LYMPHOCYTES NFR BLD: 14.2 %
MAGNESIUM SERPL-MCNC: 1.84 MG/DL (ref 1.8–2.4)
MCH RBC QN AUTO: 31.9 PG (ref 26–34)
MCHC RBC AUTO-ENTMCNC: 34.2 G/DL (ref 31–36)
MCV RBC AUTO: 93.3 FL (ref 80–100)
MONOCYTES # BLD: 0.4 K/UL (ref 0–1.3)
MONOCYTES NFR BLD: 4.4 %
NEUTROPHILS # BLD: 6.8 K/UL (ref 1.7–7.7)
NEUTROPHILS NFR BLD: 80.5 %
PERFORMED ON: ABNORMAL
PERFORMED ON: ABNORMAL
PHOSPHATE SERPL-MCNC: 3.1 MG/DL (ref 2.5–4.9)
PLATELET # BLD AUTO: 203 K/UL (ref 135–450)
PLATELET BLD QL SMEAR: ADEQUATE
PMV BLD AUTO: 8.2 FL (ref 5–10.5)
POTASSIUM SERPL-SCNC: 4.2 MMOL/L (ref 3.5–5.1)
RBC # BLD AUTO: 3.81 M/UL (ref 4–5.2)
SLIDE REVIEW: ABNORMAL
SODIUM SERPL-SCNC: 137 MMOL/L (ref 136–145)
WBC # BLD AUTO: 8.4 K/UL (ref 4–11)

## 2025-03-01 PROCEDURE — 99024 POSTOP FOLLOW-UP VISIT: CPT | Performed by: SURGERY

## 2025-03-01 PROCEDURE — 83735 ASSAY OF MAGNESIUM: CPT

## 2025-03-01 PROCEDURE — 85025 COMPLETE CBC W/AUTO DIFF WBC: CPT

## 2025-03-01 PROCEDURE — 2500000003 HC RX 250 WO HCPCS

## 2025-03-01 PROCEDURE — 6360000002 HC RX W HCPCS

## 2025-03-01 PROCEDURE — 6370000000 HC RX 637 (ALT 250 FOR IP)

## 2025-03-01 PROCEDURE — 80069 RENAL FUNCTION PANEL: CPT

## 2025-03-01 PROCEDURE — 36415 COLL VENOUS BLD VENIPUNCTURE: CPT

## 2025-03-01 RX ORDER — BACITRACIN ZINC AND POLYMYXIN B SULFATE 500; 1000 [USP'U]/G; [USP'U]/G
OINTMENT TOPICAL
Qty: 28.4 G | Refills: 1 | Status: SHIPPED | OUTPATIENT
Start: 2025-03-01 | End: 2025-03-08

## 2025-03-01 RX ORDER — OXYCODONE HYDROCHLORIDE 5 MG/1
5 TABLET ORAL EVERY 6 HOURS PRN
Qty: 28 TABLET | Refills: 0 | Status: SHIPPED | OUTPATIENT
Start: 2025-03-01 | End: 2025-03-01

## 2025-03-01 RX ORDER — CEPHALEXIN 500 MG/1
500 CAPSULE ORAL 4 TIMES DAILY
Qty: 40 CAPSULE | Refills: 0 | Status: SHIPPED | OUTPATIENT
Start: 2025-03-01 | End: 2025-03-11

## 2025-03-01 RX ORDER — DOCUSATE SODIUM 100 MG/1
100 CAPSULE, LIQUID FILLED ORAL 2 TIMES DAILY
Qty: 30 CAPSULE | Refills: 0 | Status: SHIPPED | OUTPATIENT
Start: 2025-03-01 | End: 2025-03-15

## 2025-03-01 RX ORDER — OXYCODONE HYDROCHLORIDE 5 MG/1
5 TABLET ORAL EVERY 6 HOURS PRN
Qty: 28 TABLET | Refills: 0 | Status: SHIPPED | OUTPATIENT
Start: 2025-03-01 | End: 2025-03-08

## 2025-03-01 RX ORDER — ACETAMINOPHEN 500 MG
500 TABLET ORAL 4 TIMES DAILY PRN
Qty: 120 TABLET | Refills: 0 | Status: SHIPPED | OUTPATIENT
Start: 2025-03-01

## 2025-03-01 RX ADMIN — ACETAMINOPHEN 650 MG: 325 TABLET ORAL at 08:24

## 2025-03-01 RX ADMIN — MORPHINE SULFATE 15 MG: 15 TABLET, FILM COATED, EXTENDED RELEASE ORAL at 08:24

## 2025-03-01 RX ADMIN — ESCITALOPRAM OXALATE 20 MG: 20 TABLET, FILM COATED ORAL at 08:24

## 2025-03-01 RX ADMIN — BACITRACIN: 500 OINTMENT TOPICAL at 08:29

## 2025-03-01 RX ADMIN — WATER 2000 MG: 1 INJECTION INTRAMUSCULAR; INTRAVENOUS; SUBCUTANEOUS at 02:17

## 2025-03-01 RX ADMIN — WATER 2000 MG: 1 INJECTION INTRAMUSCULAR; INTRAVENOUS; SUBCUTANEOUS at 08:23

## 2025-03-01 RX ADMIN — ACETAMINOPHEN 650 MG: 325 TABLET ORAL at 02:41

## 2025-03-01 RX ADMIN — HEPARIN SODIUM 5000 UNITS: 5000 INJECTION INTRAVENOUS; SUBCUTANEOUS at 08:24

## 2025-03-01 RX ADMIN — PANTOPRAZOLE SODIUM 40 MG: 40 TABLET, DELAYED RELEASE ORAL at 08:23

## 2025-03-01 RX ADMIN — BUSPIRONE HYDROCHLORIDE 30 MG: 10 TABLET ORAL at 08:24

## 2025-03-01 NOTE — DISCHARGE INSTRUCTIONS
[facility]    All Royal MD  [address] ([address])  [place] [address] [phone]    Post-op Instructions  ___________________________________________    Panniculectomy    The following instructions will help you know what to expect in the days following your surgery. Do not, however, hesitate to call if you have questions or concerns.    Activities   No driving, lifting, or strenuous activity. Walking is necessary after you are discharged to prevent blood clots. Walk for 10 to 15 minutes every 2 hours.   Do not sit up directly from the reclining position. When getting out of bed, turn onto your side, slide your legs out of bed, and use your arms to push yourself upright. When sitting, use the spirometer (breathing device) 10 times every 2 hours minimum.   Wear your abdominal binder day and night for 3 weeks. Keep it snug. This reduces bruising and prevents fluid from accumulating under the flap.   During the first few days after surgery, most patients find it more comfortable to sleep in a flexed position. Use pillows to keep your head and shoulders elevated, and place a pillow under your knees. If you are not comfortable sleeping in this position, you may sleep flat. It is more important to get a good night’s rest   You may shower 48 hours after surgery. Be sure to replace your abdominal binder snugly after your shower. Do not tub bathe or use hot water in the shower. Warm water is okay.   Do not exercise or do any activity that raises your heart rate or blood pressure for 2 to 3 weeks. You may resume light activity such as walking and light lifting (up to 5 pounds) within the first few days after surgery. At your follow-up appointment you may discuss resuming any strenuous activity such as biking, swimming, aerobics, or weightlifting (usually three to six weeks). Also, when returning to more exercise, start slowly and gradually work up to your daily routine.   infection can develop here as well.      It is normal to have some fluid leak from around the drain; you should change the gauze dressing around your drain if it becomes wet. Call your surgeon if there is a lot of drainage from the site, and you have to change the dressing more than once a day.        Your drain will be removed in approximately 7-10 days after your surgery. The surgeon’s medical assistant will tell you when this can be removed.    Occasionally the drain may fall out on its own. If this happens, do not panic and call your surgeon’s office.    HOW TO EMPTY YOUR DRAIN    Pull the stopper out of the top of the drain.   Pour the fluid into the container you were provided.   After the drain is empty, squeeze the drain flat and push the stopper back into the top of the drain.   Remember to record the date, time, amount and color of fluid collected.                                        RECORD YOUR DRAIN OUTPUT(S) HERE:   DRAIN Left Drain Right   Date/Time Amount  Color Date/Time Amount  Color

## 2025-03-01 NOTE — PROGRESS NOTES
Patient transferred to room 5306 from PACU. Patient is A&O x 4. VSS. Patient oriented to the room all safety measures in place. Patient given IS and SCDs at this time. Admission orders released and patient 4 eyes completed. Admission documentation completed. No other needs are noted at this time.    [x] Bed alarm on and cord plugged into wall  [x] Bed in lowest position  [x] Call light and bedside table within reach  [x] Patient educated on all safety measures  [x]Oxygen connected to wall (if applicable)     Nurse 1 Esignature: Electronically signed by Jael Carbajal RN on 2/28/25 at 7:18 PM EST  Nurse 2 Esignature: Electronically signed by Cindi Duenas RN on 2/28/25 at 7:42 PM EST

## 2025-03-01 NOTE — PROGRESS NOTES
Plastic Surgery   Daily Progress Note  Patient: Katie Ramires      CC: panniculitis    SUBJECTIVE:   Pain controlled. Tolerating diet without n/v. Ambulated. Voiding.     ROS:   A 14 point review of systems was conducted, significant findings as noted above. All other systems negative.    OBJECTIVE:    PHYSICAL EXAM:    Vitals:    02/28/25 1513 02/28/25 2030 02/28/25 2303 03/01/25 0222   BP: 128/75 100/64 108/67 110/72   Pulse: 85 89 64 54   Resp: 16 16  16   Temp: 98.6 °F (37 °C) 98 °F (36.7 °C) 98.1 °F (36.7 °C) 97.7 °F (36.5 °C)   TempSrc: Oral Oral Oral Oral   SpO2: 93% 93% 95% 96%   Weight:       Height:           General appearance: Alert, no acute distress,  Eyes: No scleral icterus, EOM grossly intact  Neck: Trachea midline, no JVD  Chest/Lungs: Normal effort with no accessory muscle use on RA  Cardiovascular: RRR, well-perfused  Abdomen:  soft, appropriately TTP, incision CDI with prineo and dermabond, reston foam, abd binder. Umbilicus with xeroform dressing removed, CDI, baci applied. DAGOBERTO b/l serosang  Skin: Warm and dry, no rashes  Extremities: No edema, no cyanosis  Neuro: A&Ox3, no focal deficits, sensation intact    LABS:   Recent Labs     03/01/25  0623   WBC 8.4   HGB 12.1   HCT 35.5*   MCV 93.3           Recent Labs     03/01/25  0623      K 4.2      CO2 26   PHOS 3.1   BUN 14   CREATININE 0.9      No results for input(s): \"AST\", \"ALT\", \"BILIDIR\", \"BILITOT\", \"ALKPHOS\" in the last 72 hours.    Invalid input(s): \"ALB\"   No results for input(s): \"LIPASE\", \"AMYLASE\" in the last 72 hours.   No results for input(s): \"INR\", \"APTT\" in the last 72 hours.    Invalid input(s): \"PROT\"   No results for input(s): \"CKTOTAL\", \"CKMB\", \"CKMBINDEX\", \"TROPONINI\" in the last 72 hours.      ASSESSMENT & PLAN:   This is a 64 y.o. female with hx asthma, DM, HLD, HTN, GILES, panniculitis s/p panniculectomy 2/28, 1 Day Post-Op      -Pain control: tylenol, oxy prn; chronic morphine  -Diet: carb control  -OOB,

## 2025-03-01 NOTE — CARE COORDINATION
Case Management           Date/ Time of Note: 3/1/2025 2:04 PM  Note completed by: RALPH Helm, DEJON    If patient is discharged prior to next notation, then this note serves as note for discharge by case management.    Patient Name: Katie Ramires  YOB: 1960    Diagnosis:Panniculitis [M79.3]  Patient Admission Status: Inpatient  Date of Admission:2/28/2025  7:50 AM    Length of Stay: 1 GLOS:   Readmission Risk Score: Readmission Risk Score: 5.7    Current Plan of Care:   SW met w/pt at bedside. SW provided imm. Pt is from home alone and indp at baseline. Pt denies needs for dc. Pt will dc home and family will transport her.     Referrals completed: Not Applicable    Resources/ information provided: Not indicated at this time    IMM Status:        Katie and her family were provided with choice of provider; she and her family are in agreement with the discharge plan.    Electronically signed by RALPH Helm, DEJON, ERUM-JAVIER on 3/1/2025 at 2:04 PM  [facility]  Case Management Department  Ph: [phone]

## 2025-03-01 NOTE — PROGRESS NOTES
4 Eyes Skin Assessment     NAME:  Katie Ramires  YOB: 1960  MEDICAL RECORD NUMBER:  [id number]    The patient is being assessed for  Admission    I agree that at least one RN has performed a thorough Head to Toe Skin Assessment on the patient. ALL assessment sites listed below have been assessed.      Areas assessed by both nurses:    Head, Face, Ears, Shoulders, Back, Chest, Arms, Elbows, Hands, Sacrum. Buttock, Coccyx, Ischium, Legs. Feet and Heels, and Under Medical Devices   ABD surgical wounds        Does the Patient have a Wound? No noted wound(s)       [place] Prevention initiated by RN: No  Wound Care Orders initiated by RN: No    Pressure Injury (Stage 3,4, Unstageable, DTI, NWPT, and Complex wounds) if present, place Wound referral order by RN under : No    New Ostomies, if present place, Ostomy referral order under : No     Nurse 1 eSignature: Electronically signed by Jael Carbajal RN on 2/28/25 at 7:17 PM EST    **SHARE this note so that the co-signing nurse can place an eSignature**    Nurse 2 eSignature: Electronically signed by Cindi Duenas RN on 2/28/25 at 7:42 PM EST

## 2025-03-03 ENCOUNTER — TELEPHONE (OUTPATIENT)
Dept: SURGERY | Age: 65
End: 2025-03-03

## 2025-03-03 NOTE — TELEPHONE ENCOUNTER
Patient is day 3 post PANNICULECTOMY. 2 drains in place, output as expected. No questions regarding drains. Denies nausea, fever, chills. Has had BM post operatively. Wearing compression as directed. Reinforced post op restrictions. Tolerating pain meds and atbx. Reminded of post op visit 3/10/25.

## 2025-03-10 ENCOUNTER — OFFICE VISIT (OUTPATIENT)
Dept: SURGERY | Age: 65
End: 2025-03-10

## 2025-03-10 VITALS
BODY MASS INDEX: 34.27 KG/M2 | DIASTOLIC BLOOD PRESSURE: 69 MMHG | TEMPERATURE: 98 F | WEIGHT: 193.4 LBS | SYSTOLIC BLOOD PRESSURE: 107 MMHG | HEIGHT: 63 IN | HEART RATE: 70 BPM

## 2025-03-10 DIAGNOSIS — Z09 POSTOP CHECK: Primary | ICD-10-CM

## 2025-03-10 PROCEDURE — 99024 POSTOP FOLLOW-UP VISIT: CPT | Performed by: SURGERY

## 2025-03-10 NOTE — PROGRESS NOTES
[facility]    PROCEDURE: Panniculectomy  DATE: 2/28/25    Katie Ramires has been recovering well since her procedure. Pain has been well controlled without pain medications.     EXAM    /69 (BP Site: Right Upper Arm)   Pulse 70   Temp 98 °F (36.7 °C)   Ht 1.6 m (5' 3\")   Wt 87.7 kg (193 lb 6.4 oz)   BMI 34.26 kg/m²      GEN: NAD   ABD: Incision healing well  No hematoma/seroma  Umbilicus viable  Drains serosang    IMP: 64 y.o.female s/p panniculectomy  PLAN: Doing well overall.  Will return next week for drain removal given the output today.     All Royal MD  [facility]  [phone]  03/10/25

## 2025-03-13 ENCOUNTER — TELEPHONE (OUTPATIENT)
Dept: SURGERY | Age: 65
End: 2025-03-13

## 2025-03-13 NOTE — TELEPHONE ENCOUNTER
Patient called stating the drain in her hip is leaking a decent amount. She states she has been having blood tinged drainage for the last three days.    Patient reports redness around the area and denies pain.    Please call patient at [phone]

## 2025-03-13 NOTE — TELEPHONE ENCOUNTER
Spoke to patient, states she's had drainage around tube insertion sites x 3 days. Today however, there is saturation of her clothing and minimal drainage In the bulb. Output yesterday was roughly 45ml per side. Patient can come for nurse visit tomorrow. Discussed with [place] and Ok to pull drains if not functional.

## 2025-03-14 ENCOUNTER — NURSE ONLY (OUTPATIENT)
Dept: SURGERY | Age: 65
End: 2025-03-14

## 2025-03-14 VITALS
DIASTOLIC BLOOD PRESSURE: 74 MMHG | HEART RATE: 70 BPM | SYSTOLIC BLOOD PRESSURE: 115 MMHG | BODY MASS INDEX: 34.8 KG/M2 | WEIGHT: 196.4 LBS | HEIGHT: 63 IN

## 2025-03-14 DIAGNOSIS — M79.3 PANNICULITIS: Primary | ICD-10-CM

## 2025-03-14 NOTE — PROGRESS NOTES
Drains have not been emptied since Tuesday. Bilateral amounts in bulb around 35-40ml. Significant amount of serous fluid leaking from tube insertion sites. There was no tegaderm or bio patch over the drain site. These were removed by the patient due to saturation. Incision is unremarkable, with post op swelling WNL. Tubes were milked and checked for function, no change. Tubes were pulled, with a significant amount of serous fluid outputting from the left drain site. Upon standing, there was no additional significant fluid output. Areas were covered with gauze and secured with a large band aide. An additional ABD pad was placed over that for clothing protection.  Patient denies increase in pain, nausea, vomiting or fever. She reported that she has been walking her dogs and patient was advised against doing so. She was instead encouraged to walk on her own and was reminded that she is under post op restrictions for 6 weeks.  Patient also stated that she has been removing her abdominal binder and scratching her abdomen repeatedly. Patient was again advised not to do so and was educated on post op infection prevention. Patient was educated on the use of zyrtec and pepcid for itching. She will keep her post op visit scheduled with Dr. Royal on 3/17/25

## 2025-03-17 ENCOUNTER — OFFICE VISIT (OUTPATIENT)
Dept: SURGERY | Age: 65
End: 2025-03-17

## 2025-03-17 VITALS
OXYGEN SATURATION: 100 % | BODY MASS INDEX: 34.61 KG/M2 | TEMPERATURE: 97 F | RESPIRATION RATE: 16 BRPM | WEIGHT: 195.4 LBS | DIASTOLIC BLOOD PRESSURE: 82 MMHG | HEART RATE: 70 BPM | SYSTOLIC BLOOD PRESSURE: 126 MMHG

## 2025-03-17 DIAGNOSIS — Z09 POSTOP CHECK: Primary | ICD-10-CM

## 2025-03-17 PROCEDURE — 99024 POSTOP FOLLOW-UP VISIT: CPT | Performed by: SURGERY

## 2025-03-28 ENCOUNTER — OFFICE VISIT (OUTPATIENT)
Dept: PULMONOLOGY | Age: 65
End: 2025-03-28
Payer: MEDICARE

## 2025-03-28 VITALS
HEIGHT: 63 IN | BODY MASS INDEX: 34.41 KG/M2 | DIASTOLIC BLOOD PRESSURE: 72 MMHG | HEART RATE: 73 BPM | WEIGHT: 194.2 LBS | OXYGEN SATURATION: 100 % | SYSTOLIC BLOOD PRESSURE: 128 MMHG

## 2025-03-28 DIAGNOSIS — E11.9 CONTROLLED TYPE 2 DIABETES MELLITUS WITHOUT COMPLICATION, UNSPECIFIED WHETHER LONG TERM INSULIN USE: Chronic | ICD-10-CM

## 2025-03-28 DIAGNOSIS — I10 ESSENTIAL HYPERTENSION: Chronic | ICD-10-CM

## 2025-03-28 DIAGNOSIS — E66.811 OBESITY (BMI 30.0-34.9): ICD-10-CM

## 2025-03-28 DIAGNOSIS — G47.33 OBSTRUCTIVE SLEEP APNEA: Primary | Chronic | ICD-10-CM

## 2025-03-28 PROCEDURE — 3078F DIAST BP <80 MM HG: CPT | Performed by: NURSE PRACTITIONER

## 2025-03-28 PROCEDURE — G2211 COMPLEX E/M VISIT ADD ON: HCPCS | Performed by: NURSE PRACTITIONER

## 2025-03-28 PROCEDURE — 3074F SYST BP LT 130 MM HG: CPT | Performed by: NURSE PRACTITIONER

## 2025-03-28 PROCEDURE — 1160F RVW MEDS BY RX/DR IN RCRD: CPT | Performed by: NURSE PRACTITIONER

## 2025-03-28 PROCEDURE — 99214 OFFICE O/P EST MOD 30 MIN: CPT | Performed by: NURSE PRACTITIONER

## 2025-03-28 PROCEDURE — 1159F MED LIST DOCD IN RCRD: CPT | Performed by: NURSE PRACTITIONER

## 2025-03-28 PROCEDURE — 1123F ACP DISCUSS/DSCN MKR DOCD: CPT | Performed by: NURSE PRACTITIONER

## 2025-03-28 ASSESSMENT — SLEEP AND FATIGUE QUESTIONNAIRES
HOW LIKELY ARE YOU TO NOD OFF OR FALL ASLEEP WHILE SITTING QUIETLY AFTER LUNCH WITHOUT ALCOHOL: WOULD NEVER DOZE
HOW LIKELY ARE YOU TO NOD OFF OR FALL ASLEEP WHILE SITTING AND READING: SLIGHT CHANCE OF DOZING
HOW LIKELY ARE YOU TO NOD OFF OR FALL ASLEEP WHILE SITTING INACTIVE IN A PUBLIC PLACE: SLIGHT CHANCE OF DOZING
ESS TOTAL SCORE: 5
HOW LIKELY ARE YOU TO NOD OFF OR FALL ASLEEP WHILE SITTING AND TALKING TO SOMEONE: WOULD NEVER DOZE
HOW LIKELY ARE YOU TO NOD OFF OR FALL ASLEEP WHILE WATCHING TV: SLIGHT CHANCE OF DOZING
HOW LIKELY ARE YOU TO NOD OFF OR FALL ASLEEP WHILE LYING DOWN TO REST IN THE AFTERNOON WHEN CIRCUMSTANCES PERMIT: SLIGHT CHANCE OF DOZING
HOW LIKELY ARE YOU TO NOD OFF OR FALL ASLEEP WHEN YOU ARE A PASSENGER IN A CAR FOR AN HOUR WITHOUT A BREAK: SLIGHT CHANCE OF DOZING
HOW LIKELY ARE YOU TO NOD OFF OR FALL ASLEEP IN A CAR, WHILE STOPPED FOR A FEW MINUTES IN TRAFFIC: WOULD NEVER DOZE

## 2025-03-28 NOTE — PROGRESS NOTES
Jas Saeed [facility]  [address]  [address]  [place] [address]  P- [phone]  F- [phone]   [facility] [place] [facility] SLEEP [facility]  [address]  [address]  [facility] [address]  Dept: [phone]  Dept Fax: [phone]  Loc: [phone]      Assessment/Plan:      1. Obstructive sleep apnea  Assessment & Plan:  Chronic-Stable: Reviewed and analyzed results of physiologic download from patient's machine and reviewed with patient.  Supplies and parts as needed for her machine.  These are medically necessary.  Limit caffeine use after 3pm. Based on the analyzed data will continue with current settings. Stable on her machine at current settings, getting benefit from the use, and having minimal side effects.  Discussed frequency in which to replace PAP equipment for optimal mask seal.  Encouraged her to contact her equipment company if she would like to try different style mask.  Could consider a chinstrap with her nasal mask and/or switching to a fullface mask for oral dryness.  Will switch to total respiratory and she was not happy with [employer].  Discussed seeing her back sooner however she declines at this time and would like to schedule follow-up for 1 year.  Encouraged her to contact the office with any questions or concerns    2. Essential hypertension  Assessment & Plan:  Chronic- Stable.  Discussed the importance of treating obstructive sleep apnea as part of the management of this disorder.  Cont any meds per PCP and other physicians.    3. Controlled type 2 diabetes mellitus without complication, unspecified whether long term insulin use (HCC)  Assessment & Plan:  Chronic- Stable.  Discussed the importance of treating obstructive sleep apnea as part of the management of this disorder.  Cont any meds per PCP and other physicians.    4. Obesity (BMI 30.0-34.9)  Assessment & Plan:  Chronic-not stable:

## 2025-03-28 NOTE — ASSESSMENT & PLAN NOTE
Chronic-Stable: Reviewed and analyzed results of physiologic download from patient's machine and reviewed with patient.  Supplies and parts as needed for her machine.  These are medically necessary.  Limit caffeine use after 3pm. Based on the analyzed data will continue with current settings. Stable on her machine at current settings, getting benefit from the use, and having minimal side effects.  Discussed frequency in which to replace PAP equipment for optimal mask seal.  Encouraged her to contact her equipment company if she would like to try different style mask.  Could consider a chinstrap with her nasal mask and/or switching to a fullface mask for oral dryness.  Will switch to total respiratory and she was not happy with [employer].  Discussed seeing her back sooner however she declines at this time and would like to schedule follow-up for 1 year.  Encouraged her to contact the office with any questions or concerns

## 2025-03-28 NOTE — PROGRESS NOTES
Diagnosis: [x] GILES (G47.33) [] CSA (G47.31) [] Apnea (G47.30)   Length of Need: [x] 15 Months [] 99 Months [] Other:   Machine (SARA!): [] Respironics Dream Station      Auto [] ResMed AirSense     Auto [] Other:     []  CPAP () [] Bilevel ()   Mode: [] Auto [] Spontaneous    Mode: [] Auto [] Spontaneous             Comfort Settings:      Humidifier: [] Heated ()        [x] Water chamber replacement ()/ 1 per 6 months        Mask:   [] Nasal () /1 per 3 months [x] Full Face () /1 per 3 months   [] Patient choice -Size and fit mask [x] Patient Choice - Size and fit mask   [] Dispense: [] Dispense:   [] Headgear () / 1 per 3 months [x] Headgear () / 1 per 3 months   [] Replacement Nasal Cushion ()/2 per month [x] Interface Replacement ()/1 per month   [] Replacement Nasal Pillows ()/2 per month         Tubing: [x] Heated ()/1 per 3 months    [] Standard ()/1 per 3 months [] Other:           Filters: [x] Non-disposable ()/1 per 6 months     [x] Ultra-Fine, Disposable ()/2 per month        Miscellaneous: [] Chin Strap ()/ 1 per 6 months [] O2 bleed-in:        LPM   [] Oxymetry on CPAP/Bilevel []  Other:         Start Order Date: 03/28/25    MEDICAL JUSTIFICATION:  I, the undersigned, certify that the above prescribed supplies are medically necessary for this patient’s wellbeing.  In my opinion, the supplies are both reasonable and necessary in reference to accepted standards of medicalpractice in treatment of this patient’s condition.    BURT SANCHEZ NP    NPI: [id number]       Order Signed Date: 03/28/25  [facility]  Pulmonary, Sleep, and Critical Care    Pulmonary, Sleep, and Critical Care  [facility] [address]                          [address] [place] [address] [address]  [place] [address]                                    [place] [address]  Phone: [phone]    Fax:

## 2025-03-28 NOTE — PROGRESS NOTES
Diagnosis: [x] GILES (G47.33) [] CSA (G47.31) [] Apnea (G47.30)   Length of Need: [x] 15 Months [] 99 Months [] Other:   Machine (SARA!): [] Respironics Dream Station      Auto [x] ResMed AirSense     Auto [] Other:     []  CPAP () [x] Bilevel ()   Mode: [] Auto [] Spontaneous    Mode: [] Auto [] Spontaneous            Comfort Settings:      Humidifier: [] Heated ()        [x] Water chamber replacement ()/ 1 per 6 months        Mask:   [x] Nasal () /1 per 3 months [] Full Face () /1 per 3 months   [x] Patient choice -Size and fit mask [] Patient Choice - Size and fit mask   [] Dispense: [] Dispense:   [x] Headgear () / 1 per 3 months [] Headgear () / 1 per 3 months   [x] Replacement Nasal Cushion ()/2 per month [] Interface Replacement ()/1 per month   [x] Replacement Nasal Pillows ()/2 per month         Tubing: [x] Heated ()/1 per 3 months    [] Standard ()/1 per 3 months [] Other:           Filters: [x] Non-disposable ()/1 per 6 months     [x] Ultra-Fine, Disposable ()/2 per month        Miscellaneous: [x] Chin Strap ()/ 1 per 6 months [] O2 bleed-in:        LPM   [] Oxymetry on CPAP/Bilevel []  Other:         Start Order Date: 03/28/25    MEDICAL JUSTIFICATION:  I, the undersigned, certify that the above prescribed supplies are medically necessary for this patient’s wellbeing.  In my opinion, the supplies are both reasonable and necessary in reference to accepted standards of medicalpractice in treatment of this patient’s condition.    BURT SANCHEZ NP    NPI: [id number]       Order Signed Date: 03/28/25  [facility]  Pulmonary, Sleep, and Critical Care    Pulmonary, Sleep, and Critical Care  [address]                          [address] [place] [address] [address]  [place] [address]                                    [place] [address]  Phone: [phone]    Fax:

## 2025-03-31 ENCOUNTER — OFFICE VISIT (OUTPATIENT)
Dept: SURGERY | Age: 65
End: 2025-03-31

## 2025-03-31 VITALS
WEIGHT: 195 LBS | OXYGEN SATURATION: 98 % | SYSTOLIC BLOOD PRESSURE: 137 MMHG | DIASTOLIC BLOOD PRESSURE: 80 MMHG | TEMPERATURE: 97.5 F | HEART RATE: 69 BPM | BODY MASS INDEX: 34.54 KG/M2

## 2025-03-31 DIAGNOSIS — Z09 POSTOP CHECK: Primary | ICD-10-CM

## 2025-03-31 PROCEDURE — 99024 POSTOP FOLLOW-UP VISIT: CPT | Performed by: SURGERY

## 2025-03-31 NOTE — PROGRESS NOTES
[facility]    PROCEDURE: Panniculectomy  DATE: 2/28/25    Katie Ramires has been recovering well since her procedure. Pain has been well controlled without pain medications.  Her drains came out last week as there was draining around them.    EXAM    /80   Pulse 69   Temp 97.5 °F (36.4 °C)   Wt 88.5 kg (195 lb)   SpO2 98%   BMI 34.54 kg/m²      GEN: NAD   ABD: Incision healing well  No hematoma/seroma  Umbilicus viable     IMP: 65 y.o.female s/p panniculectomy  PLAN: Doing well overall. She is happy with her results and will follow-up in 6 months.    All Royal MD  [facility]astic & Reconstructive Surgery  [phone]  03/31/25

## 2025-04-03 ENCOUNTER — TELEPHONE (OUTPATIENT)
Dept: SURGERY | Age: 65
End: 2025-04-03

## 2025-04-03 NOTE — TELEPHONE ENCOUNTER
Called Patient back, advised that it is best to avoid stiff, front zipping blue jeans, as she is still in her 6 week post op period and that can cause friction and sheering of the incision. Patient verbalized understanding and will continue to wear an elastic pant or pull on for the next few weeks.

## 2025-04-03 NOTE — TELEPHONE ENCOUNTER
Patient called wanting to know if she should be wearing stretchy pant or if she can go back to wearing pants that zip.    Please call patient: [phone]

## 2025-04-16 ENCOUNTER — TELEPHONE (OUTPATIENT)
Dept: PULMONOLOGY | Age: 65
End: 2025-04-16

## 2025-04-16 DIAGNOSIS — G47.33 OSA (OBSTRUCTIVE SLEEP APNEA): Primary | ICD-10-CM

## 2025-04-16 NOTE — TELEPHONE ENCOUNTER
Jimena at [facility] called to let us know that pt is under medicare and her last sleep study is not signed or scored at 4%. She stated pt would need retested in order to get supplies but pt is able to do an HST.

## 2025-05-04 ENCOUNTER — HOSPITAL ENCOUNTER (OUTPATIENT)
Dept: SLEEP CENTER | Age: 65
Discharge: HOME | End: 2025-05-04
Payer: MEDICARE

## 2025-05-04 DIAGNOSIS — G47.33 OSA (OBSTRUCTIVE SLEEP APNEA): ICD-10-CM

## 2025-05-04 PROCEDURE — 95806 SLEEP STUDY UNATT&RESP EFFT: CPT

## 2025-05-07 ENCOUNTER — TELEPHONE (OUTPATIENT)
Dept: PULMONOLOGY | Age: 65
End: 2025-05-07

## 2025-05-07 DIAGNOSIS — G47.33 OSA (OBSTRUCTIVE SLEEP APNEA): Primary | ICD-10-CM

## 2025-05-08 NOTE — TELEPHONE ENCOUNTER
Patient's sleep study is negative for GILES (however flow signal was poor for significant portions of the night). Plan will be provided by Сергей Noble MD

## 2025-05-08 NOTE — TELEPHONE ENCOUNTER
Patient report she is unable to complete an in lab study due to needing her dog for anxiety however the dog is not a licensed service dog.  Will place order for her to repeat HST given significant signal loss during the night.

## 2025-05-08 NOTE — TELEPHONE ENCOUNTER
Can try repeating HST if she would like or she could consider an in lab PSG. Please let me know how she would like to proceed.

## 2025-05-09 ENCOUNTER — TELEPHONE (OUTPATIENT)
Dept: SLEEP CENTER | Age: 65
End: 2025-05-09

## 2025-05-09 NOTE — TELEPHONE ENCOUNTER
Called to repeat the hst per Kelsie   Lots of signal loss in first appt     Deleted billing for first go round   Will bill 2nd study     Left vm for the pt to call us back

## 2025-05-18 ENCOUNTER — HOSPITAL ENCOUNTER (OUTPATIENT)
Dept: SLEEP CENTER | Age: 65
Discharge: HOME | End: 2025-05-18
Payer: MEDICARE

## 2025-05-18 DIAGNOSIS — G47.33 OSA (OBSTRUCTIVE SLEEP APNEA): ICD-10-CM

## 2025-05-18 PROCEDURE — 95806 SLEEP STUDY UNATT&RESP EFFT: CPT

## 2025-05-21 ENCOUNTER — TELEPHONE (OUTPATIENT)
Dept: PULMONOLOGY | Age: 65
End: 2025-05-21

## 2025-05-21 NOTE — TELEPHONE ENCOUNTER
Home sleep test was negative for sleep apnea however it showed a poor signal throughout large portions of the night.  If she would like we can try repeating the home sleep test or consider an in lab study.

## 2025-05-22 NOTE — TELEPHONE ENCOUNTER
Update: the second night was scored and resulted in mild GILES with AHI of 6.1 (4%).    Jas Noble MD

## (undated) DEVICE — DRESSING,GAUZE,XEROFORM,CURAD,1"X8",ST: Brand: CURAD

## (undated) DEVICE — INTENDED USE FOR SURGICAL MARKING ON INTACT SKIN, ALSO PROVIDES A PERMANENT METHOD OF IDENTIFYING OBJECTS IN THE OPERATING ROOM: Brand: WRITESITE® PLUS MINI PREP RESISTANT MARKER

## (undated) DEVICE — BLANKET THER PED W25XL33IN HYPER/HYPOTHERMIA DISP

## (undated) DEVICE — BLANKET WRM W29.9XL79.1IN UP BODY FORC AIR MISTRAL-AIR

## (undated) DEVICE — TOWEL,OR,DSP,ST,BLUE,DLX,8/PK,10PK/CS: Brand: MEDLINE

## (undated) DEVICE — TOWEL,STOP FLAG GOLD N-W: Brand: MEDLINE

## (undated) DEVICE — 1LYRTR 16FR10ML100%SIL UMS SNP: Brand: MEDLINE INDUSTRIES, INC.

## (undated) DEVICE — SUTURE PLN GUT SZ 5-0 L18IN ABSRB YELLOWISH TAN L13MM PC-1 1915G

## (undated) DEVICE — PLASMABLADE PS210-030S 3.0S LOCK: Brand: PLASMABLADE™

## (undated) DEVICE — STANDARD HYPODERMIC NEEDLE,POLYPROPYLENE HUB: Brand: MONOJECT

## (undated) DEVICE — TUBING, SUCTION, 1/4" X 12', STRAIGHT: Brand: MEDLINE

## (undated) DEVICE — SUTURE MONOCRYL SZ 3-0 L27IN ABSRB UD L19MM PS-2 3/8 CIR PRIM Y427H

## (undated) DEVICE — PLASTIC MAJOR: Brand: MEDLINE INDUSTRIES, INC.

## (undated) DEVICE — SUTURE ABSORBABLE MONOFILAMENT 3-0 SH 27 IN VIO PDS + PDP316H

## (undated) DEVICE — SEALANT TISS 10 ML FIBRIN VISTASEAL

## (undated) DEVICE — BINDER ABD H12IN FOR 30-45IN WAIST UNIV 4 PNL PREM DSGN E

## (undated) DEVICE — NEEDLE SPNL L3.5IN PNK HUB S STL REG WALL FIT STYL W/ QNCKE

## (undated) DEVICE — SYSTEM SKIN CLOSURE 42CM DERMABOND PRINEO

## (undated) DEVICE — 3M™ TEGADERM™ TRANSPARENT FILM DRESSING FRAME STYLE, 1624W, 2-3/8 IN X 2-3/4 IN (6 CM X 7 CM), 100/CT 4CT/CASE: Brand: 3M™ TEGADERM™

## (undated) DEVICE — TRAP FLUID

## (undated) DEVICE — GLOVE ORANGE PI 8 1/2   MSG9085

## (undated) DEVICE — GLOVE ORANGE PI 7 1/2   MSG9075

## (undated) DEVICE — SHEETS TRANSFER  LATERAL 34 IN X 46 IN

## (undated) DEVICE — SUTURE MONOCRYL STRATAFIX SPRL + SZ 3-0 L18IN ABSRB UD PS-2 SXMP1B107

## (undated) DEVICE — PAD FOAM 11.75X7-7/8 IN RESTON LF

## (undated) DEVICE — 1010 S-DRAPE TOWEL DRAPE 10/BX: Brand: STERI-DRAPE™

## (undated) DEVICE — BLADE CLIPPER SURG SENSICLIP

## (undated) DEVICE — APPLIER LIG CLP M L11IN TI STR RNG HNDL FOR 20 CLP DISP

## (undated) DEVICE — SUTURE VICRYL + SZ 2-0 L27IN ABSRB VLT SH L26MM 1/2 CIR TAPR

## (undated) DEVICE — SUTURE ETHILON SZ 3-0 L18IN NONABSORBABLE BLK L24MM FS-1 3/8 663G